# Patient Record
Sex: FEMALE | Race: WHITE | NOT HISPANIC OR LATINO | Employment: UNEMPLOYED | URBAN - METROPOLITAN AREA
[De-identification: names, ages, dates, MRNs, and addresses within clinical notes are randomized per-mention and may not be internally consistent; named-entity substitution may affect disease eponyms.]

---

## 2017-04-05 ENCOUNTER — OFFICE VISIT (OUTPATIENT)
Dept: URGENT CARE | Facility: CLINIC | Age: 57
End: 2017-04-05
Payer: COMMERCIAL

## 2017-04-05 VITALS
TEMPERATURE: 99 F | DIASTOLIC BLOOD PRESSURE: 70 MMHG | OXYGEN SATURATION: 99 % | HEART RATE: 74 BPM | RESPIRATION RATE: 18 BRPM | HEIGHT: 71 IN | WEIGHT: 147.5 LBS | SYSTOLIC BLOOD PRESSURE: 110 MMHG | BODY MASS INDEX: 20.65 KG/M2

## 2017-04-05 DIAGNOSIS — L25.9 CONTACT DERMATITIS, UNSPECIFIED CONTACT DERMATITIS TYPE, UNSPECIFIED TRIGGER: Primary | ICD-10-CM

## 2017-04-05 PROCEDURE — 99999 PR PBB SHADOW E&M-EST. PATIENT-LVL III: CPT | Mod: PBBFAC,,, | Performed by: NURSE PRACTITIONER

## 2017-04-05 PROCEDURE — 1160F RVW MEDS BY RX/DR IN RCRD: CPT | Mod: S$GLB,,, | Performed by: NURSE PRACTITIONER

## 2017-04-05 PROCEDURE — 99214 OFFICE O/P EST MOD 30 MIN: CPT | Mod: S$GLB,,, | Performed by: NURSE PRACTITIONER

## 2017-04-05 PROCEDURE — 96372 THER/PROPH/DIAG INJ SC/IM: CPT | Mod: S$GLB,,, | Performed by: NURSE PRACTITIONER

## 2017-04-05 RX ORDER — METHYLPREDNISOLONE ACETATE 80 MG/ML
80 INJECTION, SUSPENSION INTRA-ARTICULAR; INTRALESIONAL; INTRAMUSCULAR; SOFT TISSUE
Status: COMPLETED | OUTPATIENT
Start: 2017-04-05 | End: 2017-04-05

## 2017-04-05 RX ORDER — TRIAMCINOLONE ACETONIDE 1 MG/G
OINTMENT TOPICAL 2 TIMES DAILY
Qty: 1 TUBE | Refills: 0 | Status: SHIPPED | OUTPATIENT
Start: 2017-04-05 | End: 2022-04-01

## 2017-04-05 RX ORDER — PREDNISONE 20 MG/1
20 TABLET ORAL 2 TIMES DAILY
Qty: 10 TABLET | Refills: 0 | Status: SHIPPED | OUTPATIENT
Start: 2017-04-05 | End: 2017-04-10

## 2017-04-05 RX ADMIN — METHYLPREDNISOLONE ACETATE 80 MG: 80 INJECTION, SUSPENSION INTRA-ARTICULAR; INTRALESIONAL; INTRAMUSCULAR; SOFT TISSUE at 01:04

## 2017-04-05 NOTE — PATIENT INSTRUCTIONS
"  Contact Dermatitis  Contact dermatitis is a skin rash caused by something that touches the skin and makes it irritated and inflamed. Your skin may be red, swollen, dry, and may be cracked. Blisters may form and ooze. The rash will itch.  Contact dermatitis can form on the face and neck, backs of hands, forearms, genitals, and lower legs.  People can get contact dermatitis from lots of sources. These include:  · Plants such as poison ivy, oak, or sumac  · Chemicals in hair dyes and rinses, soaps, solvents, waxes, fingernail polish, and deodorants   · Jewelry or watchbands made of nickel  Contact dermatitis is not passed from person to person.  Talk with your healthcare provider about what may have caused the rash. A type of allergy testing called "patch testing" may be used to discover what you are allergic to. You will need to avoid the source of your rash in the future to prevent it from coming back.  Treatment is done to relieve itching and prevent the rash from coming back. The rash should go away in a few days to a few weeks.  Home care  Your healthcare provider may prescribe medicine to relieve swelling and itching. Follow all instructions when using these medicines.  General care:  · Avoid anything that heats up your skin, such as hot showers or baths, or direct sunlight. This can make itching worse.  · Apply cold compresses to soothe your sores to help relieve your symptoms. Do this for 30 minutes 3 to 4 times a day. You can make a cold compress by soaking a cloth in cold water. Squeeze out excess water. You can add colloidal oatmeal to the water to help reduce itching. For severe itching in a small area, apply an ice pack wrapped in a thin towel. Do this for 20 minutes 3 to 4 times a day.  · You can also try wet dressings. One way to do this is to wear a wet piece of clothing under a dry one. Wear a damp shirt under a dry shirt if your upper body is affected. This can relieve itching and prevent you from " scratching the affected area.  · You can also help relieve large areas of itching by taking a lukewarm bath with colloidal oatmeal added to the water.  · Use hydrocortisone cream for redness and irritation, unless another medicine was prescribed. You can also use benzocaine anesthetic cream or spray. Calamine lotion can also relieve mild symptoms.  · Use oral diphenhydramine to help reduce itching. You can buy this antihistamine at drug and grocery stores. It can make you sleepy, so use lower doses during the daytime. Or you can use loratadine. This is an antihistamine that will not make you sleepy. Do not use diphenhydramine if you have glaucoma or have trouble urinating due to an enlarged prostate.  · If a plant causes your rash, make sure to wash your skin and the clothes you were wearing when you came into contact with the plant. This is to wash away the plant oils that gave you the rash and prevent more or worse symptoms.  · Stay away from the substance or object that causes your symptoms. If you cant avoid it, wear gloves or some other type of protection.  Follow-up care  Follow up with your healthcare provider, or as advised.  When to seek medical advice  Call your healthcare provider right away if any of these occur:  · Spreading of the rash to other parts of your body  · Severe swelling of your face, eyelids, mouth, throat or tongue  · Trouble urinating due to swelling in the genital area  · Fever of 100.4°F (38°C) or higher  · Redness or swelling that gets worse  · Pain that gets worse  · Foul-smelling fluid leaking from the skin  · Yellow-brown crusts on the open blisters  Date Last Reviewed: 9/1/2016  © 1725-3393 [employer]. [address] [place] [address], [place] [address]. All rights reserved. This information is not intended as a substitute for professional medical care. Always follow your healthcare professional's instructions.

## 2017-04-05 NOTE — MR AVS SNAPSHOT
[facility]  [address] [facility] [address] [place] [address]  Phone: [phone]  Fax: [phone]                  Latasha Montes   2017 12:30 PM   Office Visit    Description:  Female : 1960   Provider:  Moody Johnson NP   Department:  [facility]           Reason for Visit     Rash           Diagnoses this Visit        Comments    Contact dermatitis, unspecified contact dermatitis type, unspecified trigger    -  Primary            To Do List           Goals (5 Years of Data)     None      Follow-Up and Disposition     Return if symptoms worsen or fail to improve.       These Medications        Disp Refills Start End    triamcinolone acetonide 0.1% (KENALOG) 0.1 % ointment 1 Tube 0 2017    Apply topically 2 (two) times daily. - Topical (Top)    Pharmacy: [id number] - [place], [address] Ph #: [phone]       predniSONE (DELTASONE) 20 MG tablet 10 tablet 0 2017 4/10/2017    Take 1 tablet (20 mg total) by mouth 2 (two) times daily. - Oral    Pharmacy: [id number] - [place], [address] Ph #: [phone]         [facility]     [facility] Nurse Care [facility] -  Assistance  Unless otherwise directed by your provider, please contact [facility], our nurse care line that is available for  assistance.     Registered nurses in the [facility] provide: appointment scheduling, clinical advisement, health education, and other advisory services.  Call: [phone] (toll free)               Medications           Message regarding Medications     Verify the changes and/or additions to your medication regime listed below are the same as discussed with your clinician today.  If any of these changes or additions are incorrect, please notify your healthcare provider.        START taking these NEW medications        Refills    triamcinolone acetonide 0.1%  "(KENALOG) 0.1 % ointment 0    Sig: Apply topically 2 (two) times daily.    Class: Normal    Route: Topical (Top)    predniSONE (DELTASONE) 20 MG tablet 0    Sig: Take 1 tablet (20 mg total) by mouth 2 (two) times daily.    Class: Normal    Route: Oral      These medications were administered today        Dose Freq    methylPREDNISolone acetate injection 80 mg 80 mg Clinic/HOD 1 time    Sig: Inject 1 mL (80 mg total) into the muscle one time.    Class: Normal    Route: Intramuscular           Verify that the below list of medications is an accurate representation of the medications you are currently taking.  If none reported, the list may be blank. If incorrect, please contact your healthcare provider. Carry this list with you in case of emergency.           Current Medications     estrogens,conjugated,-methyltestosterone 0.625-1.25mg (ESTRATEST HS) 0.625-1.25 mg per tablet Take 1 tablet by mouth once daily.    predniSONE (DELTASONE) 20 MG tablet Take 1 tablet (20 mg total) by mouth 2 (two) times daily.    sertraline (ZOLOFT) 25 MG tablet Take 25 mg by mouth once daily.    triamcinolone acetonide 0.1% (KENALOG) 0.1 % ointment Apply topically 2 (two) times daily.           Clinical Reference Information           Your Vitals Were     BP Pulse Temp Resp    110/70 (BP Location: Left arm, Patient Position: Sitting, BP Method: Manual) 74 98.5 °F (36.9 °C) (Tympanic) 18    Height Weight SpO2 BMI    5' 10.8" (1.798 m) 66.9 kg (147 lb 7.8 oz) 99% 20.69 kg/m2      Blood Pressure          Most Recent Value    BP  110/70      Allergies as of 4/5/2017     No Known Allergies      Immunizations Administered on Date of Encounter - 4/5/2017     None      [facility] Sign-Up     Activating your [facility] account is as easy as 1-2-3!     1) Visit [email or web address], select Sign Up Now, enter this activation code and your date of birth, then select Next.  [email or web address]  Expires: 5/20/2017  1:11 PM      2) Create a username and password " "to use when you visit [facility] in the future and select a security question in case you lose your password and select Next.    3) Enter your e-mail address and click Sign Up!    Additional Information  If you have questions, please e-mail [email or web address] or call [phone] to talk to our [facility] staff. Remember, [facility] is NOT to be used for urgent needs. For medical emergencies, dial 911.         Instructions      Contact Dermatitis  Contact dermatitis is a skin rash caused by something that touches the skin and makes it irritated and inflamed. Your skin may be red, swollen, dry, and may be cracked. Blisters may form and ooze. The rash will itch.  Contact dermatitis can form on the face and neck, backs of hands, forearms, genitals, and lower legs.  People can get contact dermatitis from lots of sources. These include:  · Plants such as poison ivy, oak, or sumac  · Chemicals in hair dyes and rinses, soaps, solvents, waxes, fingernail polish, and deodorants   · Jewelry or watchbands made of nickel  Contact dermatitis is not passed from person to person.  Talk with your healthcare provider about what may have caused the rash. A type of allergy testing called "patch testing" may be used to discover what you are allergic to. You will need to avoid the source of your rash in the future to prevent it from coming back.  Treatment is done to relieve itching and prevent the rash from coming back. The rash should go away in a few days to a few weeks.  Home care  Your healthcare provider may prescribe medicine to relieve swelling and itching. Follow all instructions when using these medicines.  General care:  · Avoid anything that heats up your skin, such as hot showers or baths, or direct sunlight. This can make itching worse.  · Apply cold compresses to soothe your sores to help relieve your symptoms. Do this for 30 minutes 3 to 4 times a day. You can make a cold compress by soaking a cloth in cold water. " Squeeze out excess water. You can add colloidal oatmeal to the water to help reduce itching. For severe itching in a small area, apply an ice pack wrapped in a thin towel. Do this for 20 minutes 3 to 4 times a day.  · You can also try wet dressings. One way to do this is to wear a wet piece of clothing under a dry one. Wear a damp shirt under a dry shirt if your upper body is affected. This can relieve itching and prevent you from scratching the affected area.  · You can also help relieve large areas of itching by taking a lukewarm bath with colloidal oatmeal added to the water.  · Use hydrocortisone cream for redness and irritation, unless another medicine was prescribed. You can also use benzocaine anesthetic cream or spray. Calamine lotion can also relieve mild symptoms.  · Use oral diphenhydramine to help reduce itching. You can buy this antihistamine at drug and grocery stores. It can make you sleepy, so use lower doses during the daytime. Or you can use loratadine. This is an antihistamine that will not make you sleepy. Do not use diphenhydramine if you have glaucoma or have trouble urinating due to an enlarged prostate.  · If a plant causes your rash, make sure to wash your skin and the clothes you were wearing when you came into contact with the plant. This is to wash away the plant oils that gave you the rash and prevent more or worse symptoms.  · Stay away from the substance or object that causes your symptoms. If you cant avoid it, wear gloves or some other type of protection.  Follow-up care  Follow up with your healthcare provider, or as advised.  When to seek medical advice  Call your healthcare provider right away if any of these occur:  · Spreading of the rash to other parts of your body  · Severe swelling of your face, eyelids, mouth, throat or tongue  · Trouble urinating due to swelling in the genital area  · Fever of 100.4°F (38°C) or higher  · Redness or swelling that gets worse  · Pain that  gets worse  · Foul-smelling fluid leaking from the skin  · Yellow-brown crusts on the open blisters  Date Last Reviewed: 9/1/2016  © 6327-4243 [employer]. [address] [place] [address], [place] [address]. All rights reserved. This information is not intended as a substitute for professional medical care. Always follow your healthcare professional's instructions.             Language Assistance Services     ATTENTION: Language assistance services are available, free of charge. Please call [phone].      ATENCIÓN: Si habla shaniqua, tiene a cardona disposición servicios gratuitos de asistencia lingüística. Llame al [phone].     CHÚ Ý: N?u b?n nói Ti?ng Vi?t, có các d?ch v? h? tr? ngôn ng? mi?n phí dành cho b?n. G?i s? [phone].         [facility] complies with applicable Federal civil rights laws and does not discriminate on the basis of race, color, national origin, age, disability, or sex.

## 2017-04-05 NOTE — PROGRESS NOTES
Sn Administered Methylprednisolone 80mg given IM left ventrogluteal. Patient tolerated well. No distress. Patient was instructed to wait 15 minutes in lobby after injection to assure that no reaction to the medication occurs. Patient was informed that if any unusual feeling occurs to let the  know so that we can address it. Patient stated understanding.
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
## 2017-07-29 ENCOUNTER — HOSPITAL ENCOUNTER (OUTPATIENT)
Dept: RADIOLOGY | Facility: HOSPITAL | Age: 57
Discharge: HOME | End: 2017-07-29
Attending: FAMILY MEDICINE
Payer: COMMERCIAL

## 2017-07-29 ENCOUNTER — OFFICE VISIT (OUTPATIENT)
Dept: URGENT CARE | Facility: CLINIC | Age: 57
End: 2017-07-29
Payer: COMMERCIAL

## 2017-07-29 VITALS
DIASTOLIC BLOOD PRESSURE: 69 MMHG | HEIGHT: 69 IN | BODY MASS INDEX: 22.27 KG/M2 | TEMPERATURE: 98 F | SYSTOLIC BLOOD PRESSURE: 122 MMHG | WEIGHT: 150.38 LBS

## 2017-07-29 DIAGNOSIS — M54.50 ACUTE BILATERAL LOW BACK PAIN WITHOUT SCIATICA: Primary | ICD-10-CM

## 2017-07-29 DIAGNOSIS — M54.50 ACUTE BILATERAL LOW BACK PAIN WITHOUT SCIATICA: ICD-10-CM

## 2017-07-29 DIAGNOSIS — T74.11XA: ICD-10-CM

## 2017-07-29 PROCEDURE — 99999 PR PBB SHADOW E&M-EST. PATIENT-LVL III: CPT | Mod: PBBFAC,,, | Performed by: FAMILY MEDICINE

## 2017-07-29 PROCEDURE — 99214 OFFICE O/P EST MOD 30 MIN: CPT | Mod: S$GLB,,, | Performed by: FAMILY MEDICINE

## 2017-07-29 PROCEDURE — 72100 X-RAY EXAM L-S SPINE 2/3 VWS: CPT | Mod: TC,PO

## 2017-07-29 PROCEDURE — 72100 X-RAY EXAM L-S SPINE 2/3 VWS: CPT | Mod: 26,,, | Performed by: RADIOLOGY

## 2017-07-29 NOTE — PATIENT INSTRUCTIONS
Self-Care for Low Back Pain    Most people have low back pain now and then. In many cases, it isnt serious and self-care can help. Sometimes low back pain can be a sign of a bigger problem. Call your healthcare provider if your pain returns often or gets worse over time. For the long-term care of your back, get regular exercise, lose any excess weight and learn good posture.  Take a short rest  Lying down during the day may be beneficial for short periods of time if severe pain increases with sitting or standing. Long-term bed rest could be detrimental.  Reduce pain and swelling  Cold reduces swelling. Both cold and heat can reduce pain. Protect your skin by placing a towel between your body and the ice or heat source.  · For the first few days, apply an ice pack for 15 to 20 minutes .  · After the first few days, try heat for 15 minutes at a time to ease pain. Never sleep on a heating pad.  · Over-the-counter medicine can help control pain and swelling. Try aspirin or ibuprofen.  Exercise  Exercise can help your back heal. It also helps your back get stronger and more flexible, preventing any reinjury. Ask your healthcare provider about specific exercises for your back.  Use good posture to avoid reinjury  · When moving, bend at the hips and knees. Dont bend at the waist or twist around.  · When lifting, keep the object close to your body. Dont try to lift more than you can handle.  · When sitting, keep your lower back supported. Use a rolled-up towel as needed.  Seek immediate medical care if:  · Youre unable to stand or walk.  · You have a temperature over 100.4°F (38.0°C)  · You have frequent, painful, or bloody urination.  · You have severe abdominal pain.  · You have a sharp, stabbing pain.  · Your pain is constant.  · You have pain or numbness in your leg.  · You feel pain in a new area of your back.  · You notice that the pain isnt decreasing after more than a week.   Date Last Reviewed: 9/29/2015  ©  8038-0892 [employer]. [address] [place] [address], [place] [address]. All rights reserved. This information is not intended as a substitute for professional medical care. Always follow your healthcare professional's instructions.
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
## 2017-08-31 ENCOUNTER — TELEPHONE (OUTPATIENT)
Dept: FAMILY MEDICINE | Facility: CLINIC | Age: 57
End: 2017-08-31

## 2017-08-31 NOTE — TELEPHONE ENCOUNTER
----- Message from Priscila Wilkinson sent at 8/31/2017 12:14 PM CDT -----  Please call pt back at [phone] about test results. Please call between [phone]. Please call at [phone].

## 2017-09-28 ENCOUNTER — TELEPHONE (OUTPATIENT)
Dept: FAMILY MEDICINE | Facility: CLINIC | Age: 57
End: 2017-09-28

## 2017-09-28 NOTE — TELEPHONE ENCOUNTER
----- Message from Solange Hickey sent at 9/28/2017  9:20 AM CDT -----  needs office notes faxed to her at [phone]//ph:[phone] or [phone]

## 2022-04-01 ENCOUNTER — OFFICE VISIT (OUTPATIENT)
Dept: INTERNAL MEDICINE | Facility: CLINIC | Age: 62
End: 2022-04-01
Payer: MEDICARE

## 2022-04-01 ENCOUNTER — LAB VISIT (OUTPATIENT)
Dept: LAB | Facility: HOSPITAL | Age: 62
End: 2022-04-01
Attending: FAMILY MEDICINE
Payer: MEDICARE

## 2022-04-01 DIAGNOSIS — Z01.419 WELL WOMAN EXAM: ICD-10-CM

## 2022-04-01 DIAGNOSIS — Z12.11 COLON CANCER SCREENING: ICD-10-CM

## 2022-04-01 DIAGNOSIS — R49.0 CHRONIC HOARSENESS: ICD-10-CM

## 2022-04-01 DIAGNOSIS — F41.1 GAD (GENERALIZED ANXIETY DISORDER): ICD-10-CM

## 2022-04-01 DIAGNOSIS — Z00.00 ROUTINE GENERAL MEDICAL EXAMINATION AT A HEALTH CARE FACILITY: ICD-10-CM

## 2022-04-01 DIAGNOSIS — Z00.00 ROUTINE GENERAL MEDICAL EXAMINATION AT A HEALTH CARE FACILITY: Primary | ICD-10-CM

## 2022-04-01 DIAGNOSIS — Z12.31 ENCOUNTER FOR SCREENING MAMMOGRAM FOR MALIGNANT NEOPLASM OF BREAST: ICD-10-CM

## 2022-04-01 DIAGNOSIS — Z78.0 ASYMPTOMATIC MENOPAUSAL STATE: ICD-10-CM

## 2022-04-01 DIAGNOSIS — Z13.6 SCREENING FOR CARDIOVASCULAR CONDITION: ICD-10-CM

## 2022-04-01 PROBLEM — F41.9 ANXIETY: Status: ACTIVE | Noted: 2022-04-01

## 2022-04-01 PROBLEM — F41.9 ANXIETY: Status: RESOLVED | Noted: 2022-04-01 | Resolved: 2022-04-01

## 2022-04-01 PROCEDURE — 85025 COMPLETE CBC W/AUTO DIFF WBC: CPT | Performed by: FAMILY MEDICINE

## 2022-04-01 PROCEDURE — 99999 PR PBB SHADOW E&M-NEW PATIENT-LVL III: CPT | Mod: PBBFAC,,, | Performed by: FAMILY MEDICINE

## 2022-04-01 PROCEDURE — 1159F PR MEDICATION LIST DOCUMENTED IN MEDICAL RECORD: ICD-10-PCS | Mod: CPTII,S$GLB,, | Performed by: FAMILY MEDICINE

## 2022-04-01 PROCEDURE — 86803 HEPATITIS C AB TEST: CPT | Performed by: FAMILY MEDICINE

## 2022-04-01 PROCEDURE — 1160F RVW MEDS BY RX/DR IN RCRD: CPT | Mod: CPTII,S$GLB,, | Performed by: FAMILY MEDICINE

## 2022-04-01 PROCEDURE — 1159F MED LIST DOCD IN RCRD: CPT | Mod: CPTII,S$GLB,, | Performed by: FAMILY MEDICINE

## 2022-04-01 PROCEDURE — 99999 PR PBB SHADOW E&M-NEW PATIENT-LVL III: ICD-10-PCS | Mod: PBBFAC,,, | Performed by: FAMILY MEDICINE

## 2022-04-01 PROCEDURE — 1160F PR REVIEW ALL MEDS BY PRESCRIBER/CLIN PHARMACIST DOCUMENTED: ICD-10-PCS | Mod: CPTII,S$GLB,, | Performed by: FAMILY MEDICINE

## 2022-04-01 PROCEDURE — 99204 OFFICE O/P NEW MOD 45 MIN: CPT | Mod: S$GLB,,, | Performed by: FAMILY MEDICINE

## 2022-04-01 PROCEDURE — 99204 PR OFFICE/OUTPT VISIT, NEW, LEVL IV, 45-59 MIN: ICD-10-PCS | Mod: S$GLB,,, | Performed by: FAMILY MEDICINE

## 2022-04-01 PROCEDURE — 84443 ASSAY THYROID STIM HORMONE: CPT | Performed by: FAMILY MEDICINE

## 2022-04-01 PROCEDURE — 80053 COMPREHEN METABOLIC PANEL: CPT | Performed by: FAMILY MEDICINE

## 2022-04-01 PROCEDURE — 87389 HIV-1 AG W/HIV-1&-2 AB AG IA: CPT | Performed by: FAMILY MEDICINE

## 2022-04-01 RX ORDER — DIPHENHYDRAMINE HCL 25 MG
25 CAPSULE ORAL EVERY 6 HOURS PRN
COMMUNITY
End: 2022-10-13

## 2022-04-01 RX ORDER — CYCLOSPORINE 0.5 MG/ML
1 EMULSION OPHTHALMIC
COMMUNITY
End: 2022-10-13

## 2022-04-01 RX ORDER — SERTRALINE HYDROCHLORIDE 50 MG/1
50 TABLET, FILM COATED ORAL NIGHTLY
Qty: 90 TABLET | Refills: 1 | Status: SHIPPED | OUTPATIENT
Start: 2022-04-01 | End: 2022-11-25

## 2022-04-01 RX ORDER — MUPIROCIN 20 MG/G
OINTMENT TOPICAL
COMMUNITY
End: 2023-10-09

## 2022-04-01 RX ORDER — SERTRALINE HYDROCHLORIDE 50 MG/1
50 TABLET, FILM COATED ORAL DAILY
COMMUNITY
End: 2022-04-01 | Stop reason: SDUPTHER

## 2022-04-01 NOTE — PROGRESS NOTES
"Subjective:       Patient ID: Latasha Montes is a 62 y.o. female.    Chief Complaint: Establish Care, Annual Exam, and Hoarse    62-year-old female patient new to my clinic here to establish care.  Patient with Patient Active Problem List:     EARL (generalized anxiety disorder)  Here for routine annual physicals.  Denies any chest pain or difficulty breathing with palpitations and has been staying physically active.  Reports that patient started having hoarseness to her voice in September, and has appointment with ENT for further evaluation.  Denies any allergy like symptoms with postnasal drip.   Anxiety has been stable on Zoloft 50 mg    Review of Systems   Constitutional: Negative for fatigue.   HENT: Positive for voice change. Negative for congestion, postnasal drip and trouble swallowing.    Eyes: Negative for visual disturbance.   Respiratory: Negative for shortness of breath.    Cardiovascular: Negative for chest pain and leg swelling.   Gastrointestinal: Negative for abdominal pain, nausea and vomiting.   Musculoskeletal: Negative for myalgias.   Skin: Negative for rash.   Neurological: Negative for light-headedness and headaches.   Psychiatric/Behavioral: Negative for sleep disturbance. The patient is nervous/anxious.          BP (P) 100/66 (BP Location: Left arm, Patient Position: Sitting, BP Method: Medium (Manual))   Pulse (P) 76   Resp (P) 18   Ht (P) 5' 9" (1.753 m)   Wt (P) 68.9 kg (151 lb 14.4 oz)   SpO2 (P) 97%   BMI (P) 22.43 kg/m²   Objective:      Physical Exam  Constitutional:       Appearance: She is well-developed.   HENT:      Head: Normocephalic and atraumatic.      Nose: Nose normal.      Mouth/Throat:      Mouth: Mucous membranes are moist.      Pharynx: No oropharyngeal exudate or posterior oropharyngeal erythema.   Cardiovascular:      Rate and Rhythm: Normal rate and regular rhythm.      Heart sounds: Normal heart sounds. No murmur heard.  Pulmonary:      Effort: Pulmonary " effort is normal.      Breath sounds: Normal breath sounds. No wheezing.   Abdominal:      General: Bowel sounds are normal.      Palpations: Abdomen is soft.      Tenderness: There is no abdominal tenderness.   Skin:     General: Skin is warm and dry.      Findings: No rash.   Neurological:      Mental Status: She is alert and oriented to person, place, and time.   Psychiatric:         Mood and Affect: Mood normal.           Assessment/Plan:   1. Routine general medical examination at a health care facility  - CBC Auto Differential; Future  - Comprehensive Metabolic Panel; Future  - Lipid Panel; Future  - TSH; Future  - Urinalysis, Reflex to Urine Culture Urine, Clean Catch; Future  - Hepatitis C Antibody; Future  - HIV 1/2 Ag/Ab (4th Gen); Future  Vital signs stable today.  Clinical exam stable  Continue lifestyle modifications with low-fat and low-cholesterol diet and exercise 30 minutes daily  Patient was encouraged to get tetanus and shingles vaccination at outside pharmacy    2. Encounter for screening mammogram for malignant neoplasm of breast  - Mammo Digital Screening Bilat w/ Dragan; Future  Due for mammogram and DEXA scan    3. Asymptomatic menopausal state  - DXA Bone Density Spine And Hip; Future    4. Well woman exam  - CBC Auto Differential; Future  - Ambulatory referral/consult to Gynecology; Future    5. Colon cancer screening  - Cologuard Screening (Multitarget Stool DNA); Future  - Cologuard Screening (Multitarget Stool DNA)  Due for colonoscopy but patient refuses at this time    6. EARL (generalized anxiety disorder)  - CBC Auto Differential; Future  - Comprehensive Metabolic Panel; Future  - TSH; Future  - sertraline (ZOLOFT) 50 MG tablet; Take 1 tablet (50 mg total) by mouth every evening.  Dispense: 90 tablet; Refill: 1  Stable on Zoloft 50 mg daily    7. Screening for cardiovascular condition  - Lipid Panel; Future    8. Chronic hoarseness   Patient has appointment with ENT for further  evaluation

## 2022-04-02 LAB
ALBUMIN SERPL BCP-MCNC: 4.3 G/DL (ref 3.5–5.2)
ALP SERPL-CCNC: 60 U/L (ref 55–135)
ALT SERPL W/O P-5'-P-CCNC: 23 U/L (ref 10–44)
ANION GAP SERPL CALC-SCNC: 11 MMOL/L (ref 8–16)
AST SERPL-CCNC: 30 U/L (ref 10–40)
BASOPHILS # BLD AUTO: 0.03 K/UL (ref 0–0.2)
BASOPHILS NFR BLD: 0.5 % (ref 0–1.9)
BILIRUB SERPL-MCNC: 0.6 MG/DL (ref 0.1–1)
BUN SERPL-MCNC: 14 MG/DL (ref 8–23)
CALCIUM SERPL-MCNC: 10.1 MG/DL (ref 8.7–10.5)
CHLORIDE SERPL-SCNC: 100 MMOL/L (ref 95–110)
CO2 SERPL-SCNC: 28 MMOL/L (ref 23–29)
CREAT SERPL-MCNC: 0.8 MG/DL (ref 0.5–1.4)
DIFFERENTIAL METHOD: ABNORMAL
EOSINOPHIL # BLD AUTO: 0.1 K/UL (ref 0–0.5)
EOSINOPHIL NFR BLD: 2.4 % (ref 0–8)
ERYTHROCYTE [DISTWIDTH] IN BLOOD BY AUTOMATED COUNT: 12 % (ref 11.5–14.5)
EST. GFR  (AFRICAN AMERICAN): >60 ML/MIN/1.73 M^2
EST. GFR  (NON AFRICAN AMERICAN): >60 ML/MIN/1.73 M^2
GLUCOSE SERPL-MCNC: 77 MG/DL (ref 70–110)
HCT VFR BLD AUTO: 43.3 % (ref 37–48.5)
HGB BLD-MCNC: 13.8 G/DL (ref 12–16)
IMM GRANULOCYTES # BLD AUTO: 0.01 K/UL (ref 0–0.04)
IMM GRANULOCYTES NFR BLD AUTO: 0.2 % (ref 0–0.5)
LYMPHOCYTES # BLD AUTO: 2 K/UL (ref 1–4.8)
LYMPHOCYTES NFR BLD: 34.8 % (ref 18–48)
MCH RBC QN AUTO: 29.5 PG (ref 27–31)
MCHC RBC AUTO-ENTMCNC: 31.9 G/DL (ref 32–36)
MCV RBC AUTO: 93 FL (ref 82–98)
MONOCYTES # BLD AUTO: 0.5 K/UL (ref 0.3–1)
MONOCYTES NFR BLD: 8.7 % (ref 4–15)
NEUTROPHILS # BLD AUTO: 3.1 K/UL (ref 1.8–7.7)
NEUTROPHILS NFR BLD: 53.4 % (ref 38–73)
NRBC BLD-RTO: 0 /100 WBC
PLATELET # BLD AUTO: 269 K/UL (ref 150–450)
PMV BLD AUTO: 10.5 FL (ref 9.2–12.9)
POTASSIUM SERPL-SCNC: 3.9 MMOL/L (ref 3.5–5.1)
PROT SERPL-MCNC: 7.8 G/DL (ref 6–8.4)
RBC # BLD AUTO: 4.68 M/UL (ref 4–5.4)
SODIUM SERPL-SCNC: 139 MMOL/L (ref 136–145)
TSH SERPL DL<=0.005 MIU/L-ACNC: 1.02 UIU/ML (ref 0.4–4)
WBC # BLD AUTO: 5.86 K/UL (ref 3.9–12.7)

## 2022-04-04 LAB
HCV AB SERPL QL IA: NEGATIVE
HIV 1+2 AB+HIV1 P24 AG SERPL QL IA: NEGATIVE

## 2022-04-05 ENCOUNTER — PATIENT MESSAGE (OUTPATIENT)
Dept: INTERNAL MEDICINE | Facility: CLINIC | Age: 62
End: 2022-04-05
Payer: MEDICARE

## 2022-04-05 ENCOUNTER — OFFICE VISIT (OUTPATIENT)
Dept: OBSTETRICS AND GYNECOLOGY | Facility: CLINIC | Age: 62
End: 2022-04-05
Payer: MEDICARE

## 2022-04-05 ENCOUNTER — PATIENT MESSAGE (OUTPATIENT)
Dept: OBSTETRICS AND GYNECOLOGY | Facility: CLINIC | Age: 62
End: 2022-04-05

## 2022-04-05 ENCOUNTER — TELEPHONE (OUTPATIENT)
Dept: OBSTETRICS AND GYNECOLOGY | Facility: CLINIC | Age: 62
End: 2022-04-05
Payer: MEDICARE

## 2022-04-05 VITALS
SYSTOLIC BLOOD PRESSURE: 122 MMHG | BODY MASS INDEX: 22.33 KG/M2 | DIASTOLIC BLOOD PRESSURE: 84 MMHG | WEIGHT: 151.25 LBS

## 2022-04-05 DIAGNOSIS — Z12.4 ENCOUNTER FOR SCREENING FOR CERVICAL CANCER: ICD-10-CM

## 2022-04-05 DIAGNOSIS — Z01.419 WELL WOMAN EXAM WITH ROUTINE GYNECOLOGICAL EXAM: Primary | ICD-10-CM

## 2022-04-05 PROCEDURE — 3074F PR MOST RECENT SYSTOLIC BLOOD PRESSURE < 130 MM HG: ICD-10-PCS | Mod: CPTII,S$GLB,, | Performed by: NURSE PRACTITIONER

## 2022-04-05 PROCEDURE — 88175 CYTOPATH C/V AUTO FLUID REDO: CPT | Performed by: NURSE PRACTITIONER

## 2022-04-05 PROCEDURE — 1160F PR REVIEW ALL MEDS BY PRESCRIBER/CLIN PHARMACIST DOCUMENTED: ICD-10-PCS | Mod: CPTII,S$GLB,, | Performed by: NURSE PRACTITIONER

## 2022-04-05 PROCEDURE — 99999 PR PBB SHADOW E&M-EST. PATIENT-LVL III: ICD-10-PCS | Mod: PBBFAC,,, | Performed by: NURSE PRACTITIONER

## 2022-04-05 PROCEDURE — 1159F MED LIST DOCD IN RCRD: CPT | Mod: CPTII,S$GLB,, | Performed by: NURSE PRACTITIONER

## 2022-04-05 PROCEDURE — 1159F PR MEDICATION LIST DOCUMENTED IN MEDICAL RECORD: ICD-10-PCS | Mod: CPTII,S$GLB,, | Performed by: NURSE PRACTITIONER

## 2022-04-05 PROCEDURE — 3008F PR BODY MASS INDEX (BMI) DOCUMENTED: ICD-10-PCS | Mod: CPTII,S$GLB,, | Performed by: NURSE PRACTITIONER

## 2022-04-05 PROCEDURE — 3079F PR MOST RECENT DIASTOLIC BLOOD PRESSURE 80-89 MM HG: ICD-10-PCS | Mod: CPTII,S$GLB,, | Performed by: NURSE PRACTITIONER

## 2022-04-05 PROCEDURE — 1160F RVW MEDS BY RX/DR IN RCRD: CPT | Mod: CPTII,S$GLB,, | Performed by: NURSE PRACTITIONER

## 2022-04-05 PROCEDURE — 3008F BODY MASS INDEX DOCD: CPT | Mod: CPTII,S$GLB,, | Performed by: NURSE PRACTITIONER

## 2022-04-05 PROCEDURE — 99999 PR PBB SHADOW E&M-EST. PATIENT-LVL III: CPT | Mod: PBBFAC,,, | Performed by: NURSE PRACTITIONER

## 2022-04-05 PROCEDURE — 87624 HPV HI-RISK TYP POOLED RSLT: CPT | Performed by: NURSE PRACTITIONER

## 2022-04-05 PROCEDURE — 3074F SYST BP LT 130 MM HG: CPT | Mod: CPTII,S$GLB,, | Performed by: NURSE PRACTITIONER

## 2022-04-05 PROCEDURE — 3079F DIAST BP 80-89 MM HG: CPT | Mod: CPTII,S$GLB,, | Performed by: NURSE PRACTITIONER

## 2022-04-05 RX ORDER — MUPIROCIN CALCIUM 20 MG/G
CREAM TOPICAL
Qty: 30 G | Refills: 0 | Status: SHIPPED | OUTPATIENT
Start: 2022-04-05 | End: 2022-10-13

## 2022-04-05 NOTE — PROGRESS NOTES
"Subjective:       Patient ID: Latasha Montes is a 62 y.o. female.    Chief Complaint:  Well Woman    No LMP recorded. Patient is postmenopausal.  History of Present Illness  Annual Exam-Postmenopausal  Patient presents for annual exam. The patient has no complaints today. The patient is not sexually active. GYN screening history: last pap: approximate date "" and was normal and last mammogram: approximate date "2019" and was normal. The patient is not taking hormone replacement therapy. Patient denies post-menopausal vaginal bleeding. The patient wears seatbelts: yes. The patient participates in regular exercise: no. Has the patient ever been transfused or tattooed?: no. The patient reports that there is not domestic violence in her life.    OB History    Para Term  AB Living   0 0 0 0 0 0   SAB IAB Ectopic Multiple Live Births   0 0 0 0 0       Review of Systems  Review of Systems   Constitutional: Negative for appetite change, fatigue, fever and unexpected weight change.   Eyes: Negative for visual disturbance.   Cardiovascular: Negative for chest pain.   Gastrointestinal: Negative for abdominal pain, bloating, constipation, diarrhea, nausea and vomiting.   Genitourinary: Negative for bladder incontinence, dysmenorrhea, dyspareunia, dysuria, flank pain, frequency, genital sores, menorrhagia, menstrual problem, pelvic pain, urgency, vaginal bleeding, vaginal discharge, vaginal pain, postcoital bleeding, vaginal dryness and vaginal odor.   Integumentary:  Negative for rash, acne, mole/lesion, breast mass, nipple discharge, breast skin changes and breast tenderness.   Neurological: Negative for syncope and headaches.   Hematological: Negative for adenopathy. Does not bruise/bleed easily.   All other systems reviewed and are negative.  Breast: Positive for breast self exam.Negative for asymmetry, lump, mass, nipple discharge, skin changes and tenderness           Objective:      Physical Exam: "   Constitutional: She is oriented to person, place, and time. She appears well-developed and well-nourished.    HENT:   Head: Normocephalic and atraumatic.    Eyes: Pupils are equal, round, and reactive to light. Conjunctivae and EOM are normal.     Cardiovascular: Normal rate and regular rhythm.     Pulmonary/Chest: Effort normal. Right breast exhibits no inverted nipple, no mass, no nipple discharge, no skin change, no tenderness, no bleeding and no swelling. Left breast exhibits no inverted nipple, no mass, no nipple discharge, no skin change, no tenderness, no bleeding and no swelling. Breasts are symmetrical.        Abdominal: Soft. Hernia confirmed negative in the right inguinal area and confirmed negative in the left inguinal area.     Genitourinary:    Inguinal canal, vagina, uterus, right adnexa, left adnexa and rectum normal.      Pelvic exam was performed with patient supine.   The external female genitalia was normal.   Genitalia hair distrobution normal .   Labial bartholins normal.There is no rash, tenderness, lesion or injury on the right labia. There is no rash, tenderness, lesion or injury on the left labia. Cervix is normal. No erythema,  no vaginal discharge, bleeding, rectocele, cystocele or unspecified prolapse of vaginal walls in the vagina. Vaginal atrophy noted. Cervix exhibits no motion tenderness and no friability. Uterus is not tender.           Musculoskeletal: Normal range of motion and moves all extremeties.      Lymphadenopathy: No inguinal adenopathy noted on the right or left side.    Neurological: She is alert and oriented to person, place, and time.    Skin: Skin is warm and dry. No rash noted. No erythema.    Psychiatric: She has a normal mood and affect. Her behavior is normal. Judgment and thought content normal.            Assessment:     1. Well woman exam with routine gynecological exam    2. Encounter for screening for cervical cancer              Plan:   Latasha was seen  today for well woman.    Diagnoses and all orders for this visit:    Well woman exam with routine gynecological exam  -     Ambulatory referral/consult to Gynecology  -     Liquid-Based Pap Smear, Screening  -     HPV High Risk Genotypes, PCR    Encounter for screening for cervical cancer  -     Liquid-Based Pap Smear, Screening  -     HPV High Risk Genotypes, PCR    Other orders  -     mupirocin calcium 2% (BACTROBAN) 2 % cream; Apply to affected area 3 times daily      Follow up with me in 1 year for annual well woman exam.

## 2022-04-05 NOTE — TELEPHONE ENCOUNTER
mupirocin calcium 2% (BACTROBAN) 2 % cream 30 g 0 4/5/2022 4/5/2023    Sig: Apply to affected area 3 times daily    Sent to pharmacy as: mupirocin calcium 2% (BACTROBAN) 2 % cream    E-Prescribing Status: Receipt confirmed by pharmacy (4/5/2022  8:20 AM CDT)    Pharmacy    [id number] - [place], [place] - [address] [place] [address] A     Called pharmacy and ok'd changing to ointment (looks like that's what she was using before).

## 2022-04-05 NOTE — TELEPHONE ENCOUNTER
----- Message from Guanaco Bradford sent at 4/5/2022 10:43 AM CDT -----  .Type:  Pharmacy Calling to Clarify an RX    Name of Caller: Patric  Pharmacy Name:.    [id number] - [place] [address], [facility] [address]  Phone: [phone] Fax: [phone]      Prescription Name:Bactroban cream  What do they need to clarify?:  Best Call Back Number:[phone]  Additional Information: cream unavailable would like to change to ointment

## 2022-04-15 ENCOUNTER — PATIENT MESSAGE (OUTPATIENT)
Dept: ADMINISTRATIVE | Facility: HOSPITAL | Age: 62
End: 2022-04-15
Payer: MEDICARE

## 2022-04-21 LAB — NONINV COLON CA DNA+OCC BLD SCRN STL QL: NEGATIVE

## 2022-04-25 ENCOUNTER — HOSPITAL ENCOUNTER (OUTPATIENT)
Dept: RADIOLOGY | Facility: HOSPITAL | Age: 62
Discharge: HOME | End: 2022-04-25
Attending: FAMILY MEDICINE
Payer: MEDICARE

## 2022-04-25 VITALS — BODY MASS INDEX: 22.36 KG/M2 | WEIGHT: 151 LBS | HEIGHT: 69 IN

## 2022-04-25 DIAGNOSIS — Z12.31 ENCOUNTER FOR SCREENING MAMMOGRAM FOR MALIGNANT NEOPLASM OF BREAST: ICD-10-CM

## 2022-04-25 PROCEDURE — 77063 BREAST TOMOSYNTHESIS BI: CPT | Mod: 26,,, | Performed by: RADIOLOGY

## 2022-04-25 PROCEDURE — 77063 MAMMO DIGITAL SCREENING BILAT WITH TOMO: ICD-10-PCS | Mod: 26,,, | Performed by: RADIOLOGY

## 2022-04-25 PROCEDURE — 77067 SCR MAMMO BI INCL CAD: CPT | Mod: 26,,, | Performed by: RADIOLOGY

## 2022-04-25 PROCEDURE — 77067 MAMMO DIGITAL SCREENING BILAT WITH TOMO: ICD-10-PCS | Mod: 26,,, | Performed by: RADIOLOGY

## 2022-04-25 PROCEDURE — 77067 SCR MAMMO BI INCL CAD: CPT | Mod: TC

## 2022-04-25 PROCEDURE — 77063 BREAST TOMOSYNTHESIS BI: CPT | Mod: TC

## 2022-04-29 ENCOUNTER — PATIENT MESSAGE (OUTPATIENT)
Dept: INTERNAL MEDICINE | Facility: CLINIC | Age: 62
End: 2022-04-29
Payer: MEDICARE

## 2022-05-09 ENCOUNTER — OFFICE VISIT (OUTPATIENT)
Dept: INTERNAL MEDICINE | Facility: CLINIC | Age: 62
End: 2022-05-09
Payer: MEDICARE

## 2022-05-09 DIAGNOSIS — M85.80 OSTEOPENIA, UNSPECIFIED LOCATION: Primary | ICD-10-CM

## 2022-05-09 DIAGNOSIS — F41.1 GAD (GENERALIZED ANXIETY DISORDER): ICD-10-CM

## 2022-05-09 PROCEDURE — 1160F PR REVIEW ALL MEDS BY PRESCRIBER/CLIN PHARMACIST DOCUMENTED: ICD-10-PCS | Mod: CPTII,95,, | Performed by: FAMILY MEDICINE

## 2022-05-09 PROCEDURE — 1159F MED LIST DOCD IN RCRD: CPT | Mod: CPTII,95,, | Performed by: FAMILY MEDICINE

## 2022-05-09 PROCEDURE — 99442 PR PHYSICIAN TELEPHONE EVALUATION 11-20 MIN: CPT | Mod: 95,,, | Performed by: FAMILY MEDICINE

## 2022-05-09 PROCEDURE — 1159F PR MEDICATION LIST DOCUMENTED IN MEDICAL RECORD: ICD-10-PCS | Mod: CPTII,95,, | Performed by: FAMILY MEDICINE

## 2022-05-09 PROCEDURE — 1160F RVW MEDS BY RX/DR IN RCRD: CPT | Mod: CPTII,95,, | Performed by: FAMILY MEDICINE

## 2022-05-09 PROCEDURE — 99442 PR PHYSICIAN TELEPHONE EVALUATION 11-20 MIN: ICD-10-PCS | Mod: 95,,, | Performed by: FAMILY MEDICINE

## 2022-05-09 NOTE — PROGRESS NOTES
Established Patient - Audio Only Telehealth Visit     The patient location is: Home  The chief complaint leading to consultation is: Results   Visit type: Virtual visit with audio only (telephone)  Total time spent with patient: 15 minutes  Subjective:       Patient ID: Latasha Montes is a 62 y.o. female.    Chief Complaint: No chief complaint on file.    62-year-old female patient with Patient Active Problem List:     EARL (generalized anxiety disorder)  Here to discuss about recent test results.  Reports that she has been taking Zoloft regularly and her anxiety has been stable, no worsening reported.   Has been staying physically active.  Denies any significant complaints today    Review of Systems   Musculoskeletal: Negative for arthralgias.   Psychiatric/Behavioral: The patient is nervous/anxious.          There were no vitals taken for this visit.  Objective:      Physical Exam  Neurological:      Mental Status: She is alert and oriented to person, place, and time.   Psychiatric:         Mood and Affect: Mood normal.         Office Visit on 04/05/2022   Component Date Value Ref Range Status    Cytology ThinPrep Pap Source 04/05/2022 Cervix   Final    Cytology ThinPrep Pap Report Status 04/05/2022 DNR   Final    Cytology Thinprep PAP Clinical His* 04/05/2022 Routine exam   Corrected    Cytology ThinPrep Pap LMP 04/05/2022 none   Final    Cytology ThinPrep Previous PAP 04/05/2022 Unknown   Final    Cytology ThinPrep Previous Biopsy 04/05/2022 No   Final    Cytology ThinPrep PAP Adequacy 04/05/2022 SEE BELOW   Final    Comment: Satisfactory for evaluation. Endocervical/transformation zone   component   absent.      Cytology ThinPrep PAP General Maria Victoria* 04/05/2022 DNR   Final    Cytology ThinPrep PAP Interpretati* 04/05/2022 SEE BELOW   Final    Comment: Negative for intraepithelial lesion or malignancy. Atrophic pattern;   predominantly parabasal cells      Cytology ThinPrep PAP Comment 04/05/2022 SEE  BELOW   Final    This Pap test has been evaluated with computer assisted technology.    Cytotechnologist 04/05/2022 SEE BELOW   Final    Comment: PMT, CT(ASCP) CT screening location: [address],   [facility] [address]      Review Cytotechnologist 04/05/2022 DNR   Final    Pathologist 04/05/2022 DNR   Final    Cytology ThinPrep PAP Infection 04/05/2022 DNR   Final    Cytology Thin Prep Pap Explanation 04/05/2022 SEE BELOW   Final    Comment: EXPLANATORY NOTE:     The Pap is a screening test for cervical cancer. It is   not a diagnostic test and is subject to false negative   and false positive results. It is most reliable when a   satisfactory sample, regularly obtained, is submitted   with relevant clinical findings and history, and when   the Pap result is evaluated along with historic and   current clinical information.    TEST PERFORMED AT:  [facility] [address] [address]  EFRAIN COBB MD      HPV DNA High Risk 04/05/2022 Not Detected  NOT DETECTED Final    Comment: Not Detected High Risk HPV types (16,18,31,33,35,39,45,51,52,  56,58,59,66,68) were not detected. Other HPV  types which cause anogenital lesions may be present.  The significance of the other types of HPV  in malignant processes has not been established.    Methodology: Real Time PCR                        TEST PERFORMED AT:  [facility]/[address] [address]  ARIANNA GRIGGS MD,PHD     Lab Visit on 04/01/2022   Component Date Value Ref Range Status    Specimen UA 04/01/2022 Urine, Clean Catch   Final    Color, UA 04/01/2022 Yellow  Yellow, Straw, Melony Final    Appearance, UA 04/01/2022 Clear  Clear Final    pH, UA 04/01/2022 6.0  5.0 - 8.0 Final    Specific Gravity, UA 04/01/2022 1.025  1.005 - 1.030 Final    Protein, UA 04/01/2022 Negative  Negative Final    Comment: Recommend a 24 hour urine protein or a urine    protein/creatinine ratio if globulin induced proteinuria is  clinically suspected.      Glucose, UA 04/01/2022 Negative  Negative Final    Ketones, UA 04/01/2022 Trace (A) Negative Final    Bilirubin (UA) 04/01/2022 Negative  Negative Final    Occult Blood UA 04/01/2022 Negative  Negative Final    Nitrite, UA 04/01/2022 Negative  Negative Final    Leukocytes, UA 04/01/2022 Negative  Negative Final   Lab Visit on 04/01/2022   Component Date Value Ref Range Status    Cholesterol 04/01/2022 235 (A) 120 - 199 mg/dL Final    Comment: The National Cholesterol Education Program (NCEP) has set the  following guidelines (reference ranges) for Cholesterol:  Optimal.....................<200 mg/dL  Borderline High.............200-239 mg/dL  High........................> or = 240 mg/dL      Triglycerides 04/01/2022 57  30 - 150 mg/dL Final    Comment: The National Cholesterol Education Program (NCEP) has set the  following guidelines (reference values) for triglycerides:  Normal......................<150 mg/dL  Borderline High.............150-199 mg/dL  High........................200-499 mg/dL      HDL 04/01/2022 83 (A) 40 - 75 mg/dL Final    Comment: The National Cholesterol Education Program (NCEP) has set the  following guidelines (reference values) for HDL Cholesterol:  Low...............<40 mg/dL  Optimal...........>60 mg/dL      LDL Cholesterol 04/01/2022 140.6  63.0 - 159.0 mg/dL Final    Comment: The National Cholesterol Education Program (NCEP) has set the  following guidelines (reference values) for LDL Cholesterol:  Optimal.......................<130 mg/dL  Borderline High...............130-159 mg/dL  High..........................160-189 mg/dL  Very High.....................>190 mg/dL      HDL/Cholesterol Ratio 04/01/2022 35.3  20.0 - 50.0 % Final    Total Cholesterol/HDL Ratio 04/01/2022 2.8  2.0 - 5.0 Final    Non-HDL Cholesterol 04/01/2022 152  mg/dL Final    Comment: Risk category and Non-HDL  cholesterol goals:  Coronary heart disease (CHD)or equivalent (10-year risk of CHD >20%):  Non-HDL cholesterol goal     <130 mg/dL  Two or more CHD risk factors and 10-year risk of CHD <= 20%:  Non-HDL cholesterol goal     <160 mg/dL  0 to 1 CHD risk factor:  Non-HDL cholesterol goal     <190 mg/dL     Lab Visit on 04/01/2022   Component Date Value Ref Range Status    WBC 04/01/2022 5.86  3.90 - 12.70 K/uL Final    RBC 04/01/2022 4.68  4.00 - 5.40 M/uL Final    Hemoglobin 04/01/2022 13.8  12.0 - 16.0 g/dL Final    Hematocrit 04/01/2022 43.3  37.0 - 48.5 % Final    MCV 04/01/2022 93  82 - 98 fL Final    MCH 04/01/2022 29.5  27.0 - 31.0 pg Final    MCHC 04/01/2022 31.9 (A) 32.0 - 36.0 g/dL Final    RDW 04/01/2022 12.0  11.5 - 14.5 % Final    Platelets 04/01/2022 269  150 - 450 K/uL Final    MPV 04/01/2022 10.5  9.2 - 12.9 fL Final    Immature Granulocytes 04/01/2022 0.2  0.0 - 0.5 % Final    Gran # (ANC) 04/01/2022 3.1  1.8 - 7.7 K/uL Final    Immature Grans (Abs) 04/01/2022 0.01  0.00 - 0.04 K/uL Final    Comment: Mild elevation in immature granulocytes is non specific and   can be seen in a variety of conditions including stress response,   acute inflammation, trauma and pregnancy. Correlation with other   laboratory and clinical findings is essential.      Lymph # 04/01/2022 2.0  1.0 - 4.8 K/uL Final    Mono # 04/01/2022 0.5  0.3 - 1.0 K/uL Final    Eos # 04/01/2022 0.1  0.0 - 0.5 K/uL Final    Baso # 04/01/2022 0.03  0.00 - 0.20 K/uL Final    nRBC 04/01/2022 0  0 /100 WBC Final    Gran % 04/01/2022 53.4  38.0 - 73.0 % Final    Lymph % 04/01/2022 34.8  18.0 - 48.0 % Final    Mono % 04/01/2022 8.7  4.0 - 15.0 % Final    Eosinophil % 04/01/2022 2.4  0.0 - 8.0 % Final    Basophil % 04/01/2022 0.5  0.0 - 1.9 % Final    Differential Method 04/01/2022 Automated   Final    Sodium 04/01/2022 139  136 - 145 mmol/L Final    Potassium 04/01/2022 3.9  3.5 - 5.1 mmol/L Final    Chloride 04/01/2022 100   95 - 110 mmol/L Final    CO2 04/01/2022 28  23 - 29 mmol/L Final    Glucose 04/01/2022 77  70 - 110 mg/dL Final    BUN 04/01/2022 14  8 - 23 mg/dL Final    Creatinine 04/01/2022 0.8  0.5 - 1.4 mg/dL Final    Calcium 04/01/2022 10.1  8.7 - 10.5 mg/dL Final    Total Protein 04/01/2022 7.8  6.0 - 8.4 g/dL Final    Albumin 04/01/2022 4.3  3.5 - 5.2 g/dL Final    Total Bilirubin 04/01/2022 0.6  0.1 - 1.0 mg/dL Final    Comment: For infants and newborns, interpretation of results should be based  on gestational age, weight and in agreement with clinical  observations.    Premature Infant recommended reference ranges:  Up to 24 hours.............<8.0 mg/dL  Up to 48 hours............<12.0 mg/dL  3-5 days..................<15.0 mg/dL  6-29 days.................<15.0 mg/dL      Alkaline Phosphatase 04/01/2022 60  55 - 135 U/L Final    AST 04/01/2022 30  10 - 40 U/L Final    ALT 04/01/2022 23  10 - 44 U/L Final    Anion Gap 04/01/2022 11  8 - 16 mmol/L Final    eGFR if African American 04/01/2022 >60.0  >60 mL/min/1.73 m^2 Final    eGFR if non African American 04/01/2022 >60.0  >60 mL/min/1.73 m^2 Final    Comment: Calculation used to obtain the estimated glomerular filtration  rate (eGFR) is the CKD-EPI equation.       TSH 04/01/2022 1.016  0.400 - 4.000 uIU/mL Final    Hepatitis C Ab 04/01/2022 Negative  Negative Final    HIV 1/2 Ag/Ab 04/01/2022 Negative  Negative Final   Office Visit on 04/01/2022   Component Date Value Ref Range Status    Cologuard Result 04/12/2022 Negative  Negative Final    Comment:   NEGATIVE TEST RESULT. A negative Cologuard result indicates a low likelihood that a colorectal cancer (CRC) or advanced adenoma (adenomatous polyps with more advanced pre-malignant features)  is present. The chance that a person with a negative Cologuard test has a colorectal cancer is less than 1 in 1500 (negative predictive value >99.9%) or has an  advanced adenoma is less than  5.3% (negative  predictive value 94.7%). These data are based on a prospective cross-sectional study of 10,000 individuals at average risk for colorectal cancer who were screened with both Cologuard and colonoscopy. (Bethel Rodriguez al, N Engl J Med 2014;370(14):9203-8572) The normal value (reference range) for this assay is negative.    COLOGUARD RE-SCREENING RECOMMENDATION: Periodic colorectal cancer screening is an important part of preventive healthcare for asymptomatic individuals at average risk for colorectal cancer.  Following a negative Cologuard result, the American Cancer Society and U.S.                            Multi-Society Task Force screening guidelines recommend a Cologuard re-screening interval of 3 years.   References: American Cancer Society Guideline for Colorectal Cancer Screening: https://www.cancer.org/cancer/colon-rectal-cancer/njnzctjgg-pxxbrsqxe-hkybyxx/acs-recommendations.html.; Perry CROWELL, Brett DE LA CRUZ, Andrew RAMSAYK, Colorectal Cancer Screening: Recommendations for Physicians and Patients from the U.S. Multi-Society Task Force on Colorectal Cancer Screening , Am J Gastroenterology 2017; 112:5130-9462.    TEST DESCRIPTION: Composite algorithmic analysis of stool DNA-biomarkers with hemoglobin immunoassay.   Quantitative values of individual biomarkers are not reportable and are not associated with individual biomarker result reference ranges. Cologuard is intended for colorectal cancer screening of adults of either sex, 45 years or older, who are at average-risk for colorectal cancer (CRC). Cologuard has been approved for use by the U.S. FDA. The performance of Cologuard was                            established in a cross sectional study of average-risk adults aged 50-84. Cologuard performance in patients ages 45 to 49 years was estimated by sub-group analysis of near-age groups. Colonoscopies performed for a positive result may find as the most clinically significant lesion: colorectal cancer [4.0%],  advanced adenoma (including sessile serrated polyps greater than or equal to 1cm diameter) [20%] or non- advanced adenoma [31%]; or no colorectal neoplasia [45%]. These estimates are derived from a prospective cross-sectional screening study of 10,000 individuals at average risk for colorectal cancer who were screened with both Cologuard and colonoscopy. (Bethel Rodriguez al, N Engl J Med 2014;370(14):3618-2964.) Cologuard may produce a false negative or false positive result (no colorectal cancer or precancerous polyp present at colonoscopy follow up). A negative Cologuard test result does not guarantee the absence of CRC or advanced adenoma (pre-cancer). The current Cologuard                            screening interval is every 3 years. (American Cancer Society and U.S. Multi-Society Task Force). Cologuard performance data in a 10,000 patient pivotal study using colonoscopy as the reference method can be accessed at the following location: www.[employer]/results. Additional description of the Cologuard test process, warnings and precautions can be found at www.cologuard.com.         Mammo Digital Screening Bilat w/ Dragan  Narrative: Result:  Mammo Digital Screening Bilat w/ Dragan    History:  Patient is 62 y.o. and is seen for a screening mammogram.    Films Compared:  Prior images (if available) were compared.     Findings:   This procedure was performed using tomosynthesis.   Computer-aided detection was utilized in the interpretation of this   examination.    The breasts have scattered areas of fibroglandular density. There is no   evidence of suspicious masses, microcalcifications or architectural   distortion.  Impression:    No mammographic evidence of malignancy.    BI-RADS Category 1: Negative    Recommendation:  Routine screening mammogram in 1 year is recommended.    Your estimated lifetime risk of breast cancer (to age 85) based on   Tyrer-Cuzick risk assessment model is 6.71 %.  According to the  American   Cancer Society, patients with a lifetime breast cancer risk of 20% or   higher might benefit from supplemental screening tests. ??       FINDINGS:  The L1 to L4 vertebral bone mineral density is equal to 0.93 g/cm squared with a T score of -2.1.     The left femoral neck bone mineral density is equal to 0.88 g/cm squared with a T score of -1.1.     There is a 12.5% risk of a major osteoporotic fracture and a 1.0% risk of hip fracture in the next 10 years (FRAX).     Impression:     Osteopenia     Consider FDA approved medical therapies in postmenopausal women and men aged 50 years and older, based on the following:     *A hip or vertebral (clinical or morphometric) fracture  *T score less than or equal to -2.5 at the femoral neck or spine after appropriate evaluation to exclude secondary causes.  *Low bone mass -- also known as osteopenia (T score between -1.0 and -2.5 at the femoral neck or spine) and a 10 year probability of hip fracture greater than or equal to 3% or a 10 year probability of major osteoporosis-related fracture greater than or equal to 20% based on the [place]-adapted WHO algorithm.  *Clinicians judgment and/or patient preference may indicate treatment for people with 10 year fracture probabilities is above or below these levels.          Assessment/Plan:   1. Osteopenia, unspecified location  Reviewed DEXA scan test results showing osteopenia.  Patient is currently taking calcium with vitamin-D but not interested in starting bone strengthening medication like Fosamax.  Graded exercise regimen recommended.     2. EARL (generalized anxiety disorder)   Stable on Zoloft 50 mg daily           The reason for the audio only service rather than synchronous audio and video virtual visit was related to technical difficulties or patient preference/necessity.     Each patient to whom I provide medical services by telemedicine is:  (1) informed of the relationship between the physician and patient and  the respective role of any other health care provider with respect to management of the patient; and (2) notified that they may decline to receive medical services by telemedicine and may withdraw from such care at any time. Patient verbally consented to receive this service via voice-only telephone call.       This service was not originating from a related E/M service provided within the previous 7 days nor will  to an E/M service or procedure within the next 24 hours or my soonest available appointment.  Prevailing standard of care was able to be met in this audio-only visit.

## 2022-08-16 ENCOUNTER — PES CALL (OUTPATIENT)
Dept: ADMINISTRATIVE | Facility: CLINIC | Age: 62
End: 2022-08-16
Payer: MEDICARE

## 2022-08-17 ENCOUNTER — TELEPHONE (OUTPATIENT)
Dept: ADMINISTRATIVE | Facility: HOSPITAL | Age: 62
End: 2022-08-17
Payer: MEDICARE

## 2022-10-13 ENCOUNTER — OFFICE VISIT (OUTPATIENT)
Dept: HOME HEALTH SERVICES | Facility: CLINIC | Age: 62
End: 2022-10-13
Payer: MEDICARE

## 2022-10-13 VITALS
OXYGEN SATURATION: 98 % | HEART RATE: 77 BPM | HEIGHT: 69 IN | TEMPERATURE: 98 F | SYSTOLIC BLOOD PRESSURE: 109 MMHG | WEIGHT: 151 LBS | BODY MASS INDEX: 22.36 KG/M2 | DIASTOLIC BLOOD PRESSURE: 76 MMHG

## 2022-10-13 DIAGNOSIS — J37.0 CHRONIC LARYNGITIS: ICD-10-CM

## 2022-10-13 DIAGNOSIS — E78.5 HYPERLIPIDEMIA, UNSPECIFIED HYPERLIPIDEMIA TYPE: ICD-10-CM

## 2022-10-13 DIAGNOSIS — R49.0 DYSPHONIA: ICD-10-CM

## 2022-10-13 DIAGNOSIS — M72.0 PALMAR FASCIAL FIBROMATOSIS (DUPUYTREN): ICD-10-CM

## 2022-10-13 DIAGNOSIS — H93.19 TINNITUS, UNSPECIFIED LATERALITY: ICD-10-CM

## 2022-10-13 DIAGNOSIS — M85.80 OSTEOPENIA, UNSPECIFIED LOCATION: ICD-10-CM

## 2022-10-13 DIAGNOSIS — J30.9 ALLERGIC RHINITIS, UNSPECIFIED SEASONALITY, UNSPECIFIED TRIGGER: ICD-10-CM

## 2022-10-13 DIAGNOSIS — Z00.00 ENCOUNTER FOR PREVENTIVE HEALTH EXAMINATION: Primary | ICD-10-CM

## 2022-10-13 DIAGNOSIS — R00.2 PALPITATIONS: ICD-10-CM

## 2022-10-13 DIAGNOSIS — H43.12 VITREOUS HEMORRHAGE OF LEFT EYE: ICD-10-CM

## 2022-10-13 PROCEDURE — 3074F PR MOST RECENT SYSTOLIC BLOOD PRESSURE < 130 MM HG: ICD-10-PCS | Mod: CPTII,S$GLB,, | Performed by: NURSE PRACTITIONER

## 2022-10-13 PROCEDURE — 3078F DIAST BP <80 MM HG: CPT | Mod: CPTII,S$GLB,, | Performed by: NURSE PRACTITIONER

## 2022-10-13 PROCEDURE — G0439 PPPS, SUBSEQ VISIT: HCPCS | Mod: S$GLB,,, | Performed by: NURSE PRACTITIONER

## 2022-10-13 PROCEDURE — 3074F SYST BP LT 130 MM HG: CPT | Mod: CPTII,S$GLB,, | Performed by: NURSE PRACTITIONER

## 2022-10-13 PROCEDURE — 1160F PR REVIEW ALL MEDS BY PRESCRIBER/CLIN PHARMACIST DOCUMENTED: ICD-10-PCS | Mod: CPTII,S$GLB,, | Performed by: NURSE PRACTITIONER

## 2022-10-13 PROCEDURE — 1160F RVW MEDS BY RX/DR IN RCRD: CPT | Mod: CPTII,S$GLB,, | Performed by: NURSE PRACTITIONER

## 2022-10-13 PROCEDURE — 1159F PR MEDICATION LIST DOCUMENTED IN MEDICAL RECORD: ICD-10-PCS | Mod: CPTII,S$GLB,, | Performed by: NURSE PRACTITIONER

## 2022-10-13 PROCEDURE — G0439 PR MEDICARE ANNUAL WELLNESS SUBSEQUENT VISIT: ICD-10-PCS | Mod: S$GLB,,, | Performed by: NURSE PRACTITIONER

## 2022-10-13 PROCEDURE — 1159F MED LIST DOCD IN RCRD: CPT | Mod: CPTII,S$GLB,, | Performed by: NURSE PRACTITIONER

## 2022-10-13 PROCEDURE — 99499 UNLISTED E&M SERVICE: CPT | Mod: S$GLB,,, | Performed by: NURSE PRACTITIONER

## 2022-10-13 PROCEDURE — 3078F PR MOST RECENT DIASTOLIC BLOOD PRESSURE < 80 MM HG: ICD-10-PCS | Mod: CPTII,S$GLB,, | Performed by: NURSE PRACTITIONER

## 2022-10-13 RX ORDER — VITAMIN B COMPLEX
2500 TABLET ORAL DAILY
COMMUNITY

## 2022-10-13 RX ORDER — ACETAMINOPHEN 500 MG
5000 TABLET ORAL DAILY
COMMUNITY

## 2022-10-13 RX ORDER — IBUPROFEN 100 MG/5ML
500 SUSPENSION, ORAL (FINAL DOSE FORM) ORAL DAILY
COMMUNITY

## 2022-10-13 RX ORDER — ZINC GLUCONATE 50 MG
50 TABLET ORAL DAILY
COMMUNITY

## 2022-10-13 NOTE — PROGRESS NOTES
"Latasha Montes presented for Medicare AWV today. The following components were reviewed and updated:    Medical history  Family History  Social history  Allergies and Current Medications  Health Risk Assessment  Health Maintenance  Care Team    **See Completed Assessments for Annual Wellness visit with in the encounter summary    The following assessments were completed:  Depression Screening  Cognitive function Screening      Timed Get Up Test  Whisper Test    Vitals:    10/13/22 0909   BP: 109/76   Pulse: 77   Temp: 98.2 °F (36.8 °C)   TempSrc: Temporal   SpO2: 98%   Weight: 68.5 kg (151 lb)   Height: 5' 9" (1.753 m)     Body mass index is 22.3 kg/m².   ]    Physical Exam  Vitals reviewed.   Constitutional:       Appearance: Normal appearance.   HENT:      Head: Normocephalic and atraumatic.   Cardiovascular:      Rate and Rhythm: Normal rate and regular rhythm.      Pulses: Normal pulses.      Heart sounds: Normal heart sounds.   Pulmonary:      Effort: Pulmonary effort is normal.      Breath sounds: Normal breath sounds.   Musculoskeletal:         General: Normal range of motion.      Cervical back: Normal range of motion and neck supple.      Comments: Contracted right 4th digit of the hand   Skin:     General: Skin is warm and dry.   Neurological:      Mental Status: She is alert and oriented to person, place, and time.   Psychiatric:         Mood and Affect: Mood normal.         Behavior: Behavior normal.        Outpatient Medications Marked as Taking for the 10/13/22 encounter (Office Visit) with ALLYSSA Fish   Medication Sig Dispense Refill    ascorbic acid, vitamin C, (VITAMIN C) 1000 MG tablet Take 500 mg by mouth once daily.      calcium cit/Mgox/vit D3/B6/min (CALCIUM-MAG-VIT B6-D3-MINERALS ORAL) Take 1 tablet by mouth once daily.      cartilage/collagen/bor/hyalur (JOINT HEALTH ORAL) Take 1 tablet by mouth Daily.      cholecalciferol, vitamin D3, (VITAMIN D3) 125 mcg (5,000 unit) Tab Take " 5,000 Units by mouth once daily.      cinnamon bark (CINNAMON ORAL) Take 1 tablet by mouth Daily.      COLLAGEN-BIOTIN-ASCORBIC ACID ORAL Take 1 tablet by mouth every other day.      cyanocobalamin, vitamin B-12, (VITAMIN B-12) 2,500 mcg Subl Place 2,500 mcg under the tongue once daily.      GREEN TEA EXTRACT ORAL Take 1 tablet by mouth once daily.      loratadine 10 mg Cap Take 1 tablet by mouth once daily.      sertraline (ZOLOFT) 50 MG tablet Take 1 tablet (50 mg total) by mouth every evening. 90 tablet 1    TURMERIC ORAL Take 1 tablet by mouth once daily.      zinc gluconate 50 mg tablet Take 50 mg by mouth once daily.          Diagnoses and health risks identified today and associated recommendations/orders:  1. Encounter for preventive health examination  Medicare awv complete. Health maintenance:  tetanus, shingles, and flu vaccines due-encouraged patient to obtain.     2. Vitreous hemorrhage of left eye  Chronic. Seen by [facility] ophthalmology in the past. Patient informed to have eye exam done at least once a year. Due for follow up. Referral placed to ophthalmology.   - Ambulatory referral/consult to Ophthalmology; Future    3. Palpitations  Patient states she has had this for years and had a holter monitor in the past. Patient c/o heart fluttering feeling every other month. Occurs even with sitting.     4. Palmar fascial fibromatosis (dupuytren)  Patient had surgery 6/1/2022. Recommend f/u with surgeon Dr. Everett Garcia.     5. Hyperlipidemia, unspecified hyperlipidemia type  Lab Results   Component Value Date    CHOL 235 (H) 04/01/2022     Lab Results   Component Value Date    HDL 83 (H) 04/01/2022     Lab Results   Component Value Date    LDLCALC 140.6 04/01/2022     Lab Results   Component Value Date    TRIG 57 04/01/2022     Lab Results   Component Value Date    CHOLHDL 35.3 04/01/2022    Chronic. Recommend mediterranean diet and exercise. Follow up with PCP.      6. Chronic laryngitis  Chronic and stable on  current management. See med list above. Follow up with PCP.      7. Osteopenia, unspecified location  Chronic and stable on current management. Continue vitamin d, calcium in the diet, and weight bearing exercise. See med list above. Follow up with PCP.       8. Dysphonia  Went to ST. Completed treatment.     9. Tinnitus, unspecified laterality  Chronic and stable on current management. 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 Follow up with PCP.      10. Allergic rhinitis, unspecified seasonality, unspecified trigger  Chronic and stable on current management. On claritin. See med list above. Follow up with PCP.          Provided Latasha with a 5-10 year written screening schedule and personal prevention plan. Recommendations were developed using the USPSTF age appropriate recommendations. Education, counseling, and referrals were provided as needed.  After Visit Summary printed and given to patient which includes a list of additional screenings\tests needed.    Follow up in about 1 year (around 10/13/2023) for annual wellness visit.      ALLYSSA Fish      I offered to discuss advanced care planning, including how to pick a person who would make decisions for you if you were unable to make them for yourself, called a health care power of , and what kind of decisions you might make such as use of life sustaining treatments such as ventilators and tube feeding when faced with a life limiting illness recorded on a living will that they will need to know. (How you want to be cared for as you near the end of your natural life)     X Patient is interested in learning more about how to make advanced directives.  I provided them paperwork and offered to discuss this with them.

## 2022-10-13 NOTE — Clinical Note
Medicare awv complete. Health maintenance:  tetanus, shingles, and flu vaccines due-encouraged patient to obtain.   2. Vitreous hemorrhage of left eye Chronic. Seen by [facility] ophthalmology in the past. Patient informed to have eye exam done at least once a year. Due for follow up. Referral placed to ophthalmology.

## 2022-10-13 NOTE — PATIENT INSTRUCTIONS
Counseling and Referral of Other Preventative  (Italic type indicates deductible and co-insurance are waived)    Patient Name: Latasha Montes  Today's Date: 10/13/2022    Health Maintenance       Date Due Completion Date    TETANUS VACCINE Never done ---    Shingles Vaccine (1 of 2) Never done ---    Influenza Vaccine (1) Never done ---    COVID-19 Vaccine (1) 10/13/2023 (Originally 1960) ---    Mammogram 04/25/2023 4/25/2022    Colorectal Cancer Screening 04/12/2025 4/12/2022    Lipid Panel 04/01/2027 4/1/2022    Cervical Cancer Screening 04/05/2027 4/5/2022        No orders of the defined types were placed in this encounter.    The following information is provided to all patients.  This information is to help you find resources for any of the problems found today that may be affecting your health:                Living healthy guide: www.[facility].[place].[facility]      Understanding Diabetes: www.diabetes.org      Eating healthy: www.cdc.gov/healthyweight      CDC home safety checklist: www.cdc.gov/steadi/patient.html      Agency on Aging: www.goea.[place].[facility]      Alcoholics anonymous (AA): www.aa.org      Physical Activity: www.alison.nih.gov/ch6atjt      Tobacco use: www.quitwithusla.org

## 2022-10-14 ENCOUNTER — PATIENT MESSAGE (OUTPATIENT)
Dept: INTERNAL MEDICINE | Facility: CLINIC | Age: 62
End: 2022-10-14
Payer: MEDICARE

## 2023-03-14 ENCOUNTER — OFFICE VISIT (OUTPATIENT)
Dept: INTERNAL MEDICINE | Facility: CLINIC | Age: 63
End: 2023-03-14
Payer: MEDICARE

## 2023-03-14 VITALS
DIASTOLIC BLOOD PRESSURE: 64 MMHG | BODY MASS INDEX: 22.79 KG/M2 | HEIGHT: 69 IN | TEMPERATURE: 97 F | HEART RATE: 85 BPM | WEIGHT: 153.88 LBS | OXYGEN SATURATION: 98 % | SYSTOLIC BLOOD PRESSURE: 110 MMHG

## 2023-03-14 DIAGNOSIS — R10.9 ABDOMINAL DISCOMFORT: ICD-10-CM

## 2023-03-14 DIAGNOSIS — F41.1 GAD (GENERALIZED ANXIETY DISORDER): Primary | ICD-10-CM

## 2023-03-14 DIAGNOSIS — R19.5 LOOSE STOOLS: ICD-10-CM

## 2023-03-14 PROCEDURE — 3008F BODY MASS INDEX DOCD: CPT | Mod: CPTII,S$GLB,, | Performed by: NURSE PRACTITIONER

## 2023-03-14 PROCEDURE — 3078F DIAST BP <80 MM HG: CPT | Mod: CPTII,S$GLB,, | Performed by: NURSE PRACTITIONER

## 2023-03-14 PROCEDURE — 3288F FALL RISK ASSESSMENT DOCD: CPT | Mod: CPTII,S$GLB,, | Performed by: NURSE PRACTITIONER

## 2023-03-14 PROCEDURE — 1159F PR MEDICATION LIST DOCUMENTED IN MEDICAL RECORD: ICD-10-PCS | Mod: CPTII,S$GLB,, | Performed by: NURSE PRACTITIONER

## 2023-03-14 PROCEDURE — 3288F PR FALLS RISK ASSESSMENT DOCUMENTED: ICD-10-PCS | Mod: CPTII,S$GLB,, | Performed by: NURSE PRACTITIONER

## 2023-03-14 PROCEDURE — 3074F PR MOST RECENT SYSTOLIC BLOOD PRESSURE < 130 MM HG: ICD-10-PCS | Mod: CPTII,S$GLB,, | Performed by: NURSE PRACTITIONER

## 2023-03-14 PROCEDURE — 99999 PR PBB SHADOW E&M-EST. PATIENT-LVL IV: CPT | Mod: PBBFAC,,, | Performed by: NURSE PRACTITIONER

## 2023-03-14 PROCEDURE — 99214 OFFICE O/P EST MOD 30 MIN: CPT | Mod: S$GLB,,, | Performed by: NURSE PRACTITIONER

## 2023-03-14 PROCEDURE — 1101F PR PT FALLS ASSESS DOC 0-1 FALLS W/OUT INJ PAST YR: ICD-10-PCS | Mod: CPTII,S$GLB,, | Performed by: NURSE PRACTITIONER

## 2023-03-14 PROCEDURE — 1101F PT FALLS ASSESS-DOCD LE1/YR: CPT | Mod: CPTII,S$GLB,, | Performed by: NURSE PRACTITIONER

## 2023-03-14 PROCEDURE — 3078F PR MOST RECENT DIASTOLIC BLOOD PRESSURE < 80 MM HG: ICD-10-PCS | Mod: CPTII,S$GLB,, | Performed by: NURSE PRACTITIONER

## 2023-03-14 PROCEDURE — 99999 PR PBB SHADOW E&M-EST. PATIENT-LVL IV: ICD-10-PCS | Mod: PBBFAC,,, | Performed by: NURSE PRACTITIONER

## 2023-03-14 PROCEDURE — 99214 PR OFFICE/OUTPT VISIT, EST, LEVL IV, 30-39 MIN: ICD-10-PCS | Mod: S$GLB,,, | Performed by: NURSE PRACTITIONER

## 2023-03-14 PROCEDURE — 3008F PR BODY MASS INDEX (BMI) DOCUMENTED: ICD-10-PCS | Mod: CPTII,S$GLB,, | Performed by: NURSE PRACTITIONER

## 2023-03-14 PROCEDURE — 3074F SYST BP LT 130 MM HG: CPT | Mod: CPTII,S$GLB,, | Performed by: NURSE PRACTITIONER

## 2023-03-14 PROCEDURE — 1159F MED LIST DOCD IN RCRD: CPT | Mod: CPTII,S$GLB,, | Performed by: NURSE PRACTITIONER

## 2023-03-14 RX ORDER — SERTRALINE HYDROCHLORIDE 25 MG/1
25 TABLET, FILM COATED ORAL DAILY
Qty: 30 TABLET | Refills: 11 | Status: SHIPPED | OUTPATIENT
Start: 2023-03-14 | End: 2023-04-03
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
## 2023-03-15 ENCOUNTER — LAB VISIT (OUTPATIENT)
Dept: LAB | Facility: HOSPITAL | Age: 63
End: 2023-03-15
Payer: MEDICARE

## 2023-03-15 DIAGNOSIS — R10.9 ABDOMINAL DISCOMFORT: ICD-10-CM

## 2023-03-15 PROCEDURE — 87046 STOOL CULTR AEROBIC BACT EA: CPT | Mod: 59 | Performed by: NURSE PRACTITIONER

## 2023-03-15 PROCEDURE — 87338 HPYLORI STOOL AG IA: CPT | Performed by: NURSE PRACTITIONER

## 2023-03-15 PROCEDURE — 87329 GIARDIA AG IA: CPT | Performed by: NURSE PRACTITIONER

## 2023-03-15 PROCEDURE — 87209 SMEAR COMPLEX STAIN: CPT | Performed by: NURSE PRACTITIONER

## 2023-03-15 PROCEDURE — 87425 ROTAVIRUS AG IA: CPT | Performed by: NURSE PRACTITIONER

## 2023-03-15 PROCEDURE — 87427 SHIGA-LIKE TOXIN AG IA: CPT | Mod: 59 | Performed by: NURSE PRACTITIONER

## 2023-03-15 PROCEDURE — 87177 OVA AND PARASITES SMEARS: CPT | Performed by: NURSE PRACTITIONER

## 2023-03-15 PROCEDURE — 87045 FECES CULTURE AEROBIC BACT: CPT | Performed by: NURSE PRACTITIONER

## 2023-03-16 LAB
CRYPTOSP AG STL QL IA: NEGATIVE
G LAMBLIA AG STL QL IA: NEGATIVE
RV AG STL QL IA.RAPID: NEGATIVE

## 2023-03-17 LAB
E COLI SXT1 STL QL IA: NEGATIVE
E COLI SXT2 STL QL IA: NEGATIVE

## 2023-03-20 ENCOUNTER — TELEPHONE (OUTPATIENT)
Dept: INTERNAL MEDICINE | Facility: CLINIC | Age: 63
End: 2023-03-20
Payer: MEDICARE

## 2023-03-20 LAB — BACTERIA STL CULT: NORMAL

## 2023-03-20 NOTE — TELEPHONE ENCOUNTER
----- Message from Maricruz Lindsay sent at 3/20/2023  8:37 AM CDT -----  Contact: [phone]  Patient is calling for specimen results. Please call patient at [phone]. Thanks!       Pt stated if she is unable to be reached the results can be explained on her voicemail or sent via message or text. Thanks

## 2023-03-20 NOTE — TELEPHONE ENCOUNTER
----- Message from Maricruz Lindsay sent at 3/20/2023  8:37 AM CDT -----  Contact: [phone]  Patient is calling for specimen results. Please call patient at [phone]. Thanks!       Pt stated if she is unable to be reached the results can be explained on her voicemail or sent via message or text. Thanks

## 2023-03-20 NOTE — TELEPHONE ENCOUNTER
Spoke with patient regarding lab results patient stated she understand.//KELBY                      ----- Message from Maricruz Lindsay sent at 3/20/2023  8:37 AM CDT -----  Contact: [phone]  Patient is calling for specimen results. Please call patient at [phone]. Thanks!       Pt stated if she is unable to be reached the results can be explained on her voicemail or sent via message or text. Thanks

## 2023-03-23 LAB
H PYLORI AG STL QL IA: NOT DETECTED
SPECIMEN SOURCE: NORMAL

## 2023-03-26 LAB — O+P STL MICRO: NORMAL

## 2023-03-27 ENCOUNTER — TELEPHONE (OUTPATIENT)
Dept: ADMINISTRATIVE | Facility: HOSPITAL | Age: 63
End: 2023-03-27
Payer: MEDICARE

## 2023-03-29 ENCOUNTER — TELEPHONE (OUTPATIENT)
Dept: INTERNAL MEDICINE | Facility: CLINIC | Age: 63
End: 2023-03-29
Payer: MEDICARE

## 2023-03-29 DIAGNOSIS — Z12.31 ENCOUNTER FOR SCREENING MAMMOGRAM FOR MALIGNANT NEOPLASM OF BREAST: Primary | ICD-10-CM

## 2023-03-29 NOTE — TELEPHONE ENCOUNTER
----- Message from Deloris Lira sent at 3/29/2023 10:24 AM CDT -----  Pt is requesting an order for a mammogram and bone density test. Madyson leo

## 2023-04-03 ENCOUNTER — LAB VISIT (OUTPATIENT)
Dept: LAB | Facility: HOSPITAL | Age: 63
End: 2023-04-03
Attending: FAMILY MEDICINE
Payer: MEDICARE

## 2023-04-03 ENCOUNTER — OFFICE VISIT (OUTPATIENT)
Dept: INTERNAL MEDICINE | Facility: CLINIC | Age: 63
End: 2023-04-03
Payer: MEDICARE

## 2023-04-03 VITALS
HEART RATE: 79 BPM | SYSTOLIC BLOOD PRESSURE: 112 MMHG | RESPIRATION RATE: 18 BRPM | DIASTOLIC BLOOD PRESSURE: 72 MMHG | OXYGEN SATURATION: 99 % | WEIGHT: 150.81 LBS | BODY MASS INDEX: 22.34 KG/M2 | HEIGHT: 69 IN

## 2023-04-03 DIAGNOSIS — M85.80 OSTEOPENIA, UNSPECIFIED LOCATION: ICD-10-CM

## 2023-04-03 DIAGNOSIS — Z13.6 SCREENING FOR CARDIOVASCULAR CONDITION: ICD-10-CM

## 2023-04-03 DIAGNOSIS — M47.26 OSTEOARTHRITIS OF SPINE WITH RADICULOPATHY, LUMBAR REGION: ICD-10-CM

## 2023-04-03 DIAGNOSIS — M23.8X2 CREPITUS OF BOTH KNEE JOINTS: ICD-10-CM

## 2023-04-03 DIAGNOSIS — F41.1 GAD (GENERALIZED ANXIETY DISORDER): Primary | ICD-10-CM

## 2023-04-03 DIAGNOSIS — M23.8X1 CREPITUS OF BOTH KNEE JOINTS: ICD-10-CM

## 2023-04-03 DIAGNOSIS — F41.1 GAD (GENERALIZED ANXIETY DISORDER): ICD-10-CM

## 2023-04-03 PROBLEM — J30.9 ALLERGIC RHINITIS, UNSPECIFIED: Status: RESOLVED | Noted: 2022-10-13 | Resolved: 2023-04-03

## 2023-04-03 PROBLEM — H43.12 VITREOUS HEMORRHAGE OF LEFT EYE: Status: RESOLVED | Noted: 2022-10-13 | Resolved: 2023-04-03

## 2023-04-03 LAB
ALBUMIN SERPL BCP-MCNC: 4 G/DL (ref 3.5–5.2)
ALP SERPL-CCNC: 56 U/L (ref 55–135)
ALT SERPL W/O P-5'-P-CCNC: 19 U/L (ref 10–44)
ANION GAP SERPL CALC-SCNC: 9 MMOL/L (ref 8–16)
AST SERPL-CCNC: 24 U/L (ref 10–40)
BASOPHILS # BLD AUTO: 0.03 K/UL (ref 0–0.2)
BASOPHILS NFR BLD: 0.4 % (ref 0–1.9)
BILIRUB SERPL-MCNC: 0.6 MG/DL (ref 0.1–1)
BUN SERPL-MCNC: 10 MG/DL (ref 8–23)
CALCIUM SERPL-MCNC: 9.8 MG/DL (ref 8.7–10.5)
CHLORIDE SERPL-SCNC: 105 MMOL/L (ref 95–110)
CHOLEST SERPL-MCNC: 239 MG/DL (ref 120–199)
CHOLEST/HDLC SERPL: 3 {RATIO} (ref 2–5)
CO2 SERPL-SCNC: 26 MMOL/L (ref 23–29)
CREAT SERPL-MCNC: 0.7 MG/DL (ref 0.5–1.4)
DIFFERENTIAL METHOD: ABNORMAL
EOSINOPHIL # BLD AUTO: 0.2 K/UL (ref 0–0.5)
EOSINOPHIL NFR BLD: 2.1 % (ref 0–8)
ERYTHROCYTE [DISTWIDTH] IN BLOOD BY AUTOMATED COUNT: 12 % (ref 11.5–14.5)
EST. GFR  (NO RACE VARIABLE): >60 ML/MIN/1.73 M^2
GLUCOSE SERPL-MCNC: 80 MG/DL (ref 70–110)
HCT VFR BLD AUTO: 42.7 % (ref 37–48.5)
HDLC SERPL-MCNC: 79 MG/DL (ref 40–75)
HDLC SERPL: 33.1 % (ref 20–50)
HGB BLD-MCNC: 13.6 G/DL (ref 12–16)
IMM GRANULOCYTES # BLD AUTO: 0.02 K/UL (ref 0–0.04)
IMM GRANULOCYTES NFR BLD AUTO: 0.3 % (ref 0–0.5)
LDLC SERPL CALC-MCNC: 145 MG/DL (ref 63–159)
LYMPHOCYTES # BLD AUTO: 2 K/UL (ref 1–4.8)
LYMPHOCYTES NFR BLD: 27.9 % (ref 18–48)
MCH RBC QN AUTO: 29 PG (ref 27–31)
MCHC RBC AUTO-ENTMCNC: 31.9 G/DL (ref 32–36)
MCV RBC AUTO: 91 FL (ref 82–98)
MONOCYTES # BLD AUTO: 0.6 K/UL (ref 0.3–1)
MONOCYTES NFR BLD: 9 % (ref 4–15)
NEUTROPHILS # BLD AUTO: 4.3 K/UL (ref 1.8–7.7)
NEUTROPHILS NFR BLD: 60.3 % (ref 38–73)
NONHDLC SERPL-MCNC: 160 MG/DL
NRBC BLD-RTO: 0 /100 WBC
PLATELET # BLD AUTO: 263 K/UL (ref 150–450)
PMV BLD AUTO: 10 FL (ref 9.2–12.9)
POTASSIUM SERPL-SCNC: 4.8 MMOL/L (ref 3.5–5.1)
PROT SERPL-MCNC: 7.3 G/DL (ref 6–8.4)
RBC # BLD AUTO: 4.69 M/UL (ref 4–5.4)
SODIUM SERPL-SCNC: 140 MMOL/L (ref 136–145)
TRIGL SERPL-MCNC: 75 MG/DL (ref 30–150)
WBC # BLD AUTO: 7.1 K/UL (ref 3.9–12.7)

## 2023-04-03 PROCEDURE — 99214 OFFICE O/P EST MOD 30 MIN: CPT | Mod: S$GLB,,, | Performed by: FAMILY MEDICINE

## 2023-04-03 PROCEDURE — 3078F DIAST BP <80 MM HG: CPT | Mod: CPTII,S$GLB,, | Performed by: FAMILY MEDICINE

## 2023-04-03 PROCEDURE — 36415 COLL VENOUS BLD VENIPUNCTURE: CPT | Performed by: FAMILY MEDICINE

## 2023-04-03 PROCEDURE — 80061 LIPID PANEL: CPT | Performed by: FAMILY MEDICINE

## 2023-04-03 PROCEDURE — 99214 PR OFFICE/OUTPT VISIT, EST, LEVL IV, 30-39 MIN: ICD-10-PCS | Mod: S$GLB,,, | Performed by: FAMILY MEDICINE

## 2023-04-03 PROCEDURE — 1101F PT FALLS ASSESS-DOCD LE1/YR: CPT | Mod: CPTII,S$GLB,, | Performed by: FAMILY MEDICINE

## 2023-04-03 PROCEDURE — 1159F PR MEDICATION LIST DOCUMENTED IN MEDICAL RECORD: ICD-10-PCS | Mod: CPTII,S$GLB,, | Performed by: FAMILY MEDICINE

## 2023-04-03 PROCEDURE — 3288F PR FALLS RISK ASSESSMENT DOCUMENTED: ICD-10-PCS | Mod: CPTII,S$GLB,, | Performed by: FAMILY MEDICINE

## 2023-04-03 PROCEDURE — 3074F SYST BP LT 130 MM HG: CPT | Mod: CPTII,S$GLB,, | Performed by: FAMILY MEDICINE

## 2023-04-03 PROCEDURE — 1159F MED LIST DOCD IN RCRD: CPT | Mod: CPTII,S$GLB,, | Performed by: FAMILY MEDICINE

## 2023-04-03 PROCEDURE — 85025 COMPLETE CBC W/AUTO DIFF WBC: CPT | Performed by: FAMILY MEDICINE

## 2023-04-03 PROCEDURE — 3288F FALL RISK ASSESSMENT DOCD: CPT | Mod: CPTII,S$GLB,, | Performed by: FAMILY MEDICINE

## 2023-04-03 PROCEDURE — 3008F BODY MASS INDEX DOCD: CPT | Mod: CPTII,S$GLB,, | Performed by: FAMILY MEDICINE

## 2023-04-03 PROCEDURE — 1101F PR PT FALLS ASSESS DOC 0-1 FALLS W/OUT INJ PAST YR: ICD-10-PCS | Mod: CPTII,S$GLB,, | Performed by: FAMILY MEDICINE

## 2023-04-03 PROCEDURE — 3008F PR BODY MASS INDEX (BMI) DOCUMENTED: ICD-10-PCS | Mod: CPTII,S$GLB,, | Performed by: FAMILY MEDICINE

## 2023-04-03 PROCEDURE — 3078F PR MOST RECENT DIASTOLIC BLOOD PRESSURE < 80 MM HG: ICD-10-PCS | Mod: CPTII,S$GLB,, | Performed by: FAMILY MEDICINE

## 2023-04-03 PROCEDURE — 3074F PR MOST RECENT SYSTOLIC BLOOD PRESSURE < 130 MM HG: ICD-10-PCS | Mod: CPTII,S$GLB,, | Performed by: FAMILY MEDICINE

## 2023-04-03 PROCEDURE — 1160F RVW MEDS BY RX/DR IN RCRD: CPT | Mod: CPTII,S$GLB,, | Performed by: FAMILY MEDICINE

## 2023-04-03 PROCEDURE — 99999 PR PBB SHADOW E&M-EST. PATIENT-LVL IV: ICD-10-PCS | Mod: PBBFAC,,, | Performed by: FAMILY MEDICINE

## 2023-04-03 PROCEDURE — 80053 COMPREHEN METABOLIC PANEL: CPT | Performed by: FAMILY MEDICINE

## 2023-04-03 PROCEDURE — 99999 PR PBB SHADOW E&M-EST. PATIENT-LVL IV: CPT | Mod: PBBFAC,,, | Performed by: FAMILY MEDICINE

## 2023-04-03 PROCEDURE — 1160F PR REVIEW ALL MEDS BY PRESCRIBER/CLIN PHARMACIST DOCUMENTED: ICD-10-PCS | Mod: CPTII,S$GLB,, | Performed by: FAMILY MEDICINE

## 2023-04-03 PROCEDURE — 84443 ASSAY THYROID STIM HORMONE: CPT | Performed by: FAMILY MEDICINE

## 2023-04-03 RX ORDER — SERTRALINE HYDROCHLORIDE 50 MG/1
50 TABLET, FILM COATED ORAL NIGHTLY
Qty: 90 TABLET | Refills: 1 | Status: SHIPPED | OUTPATIENT
Start: 2023-04-03 | End: 2023-10-09 | Stop reason: SDUPTHER

## 2023-04-03 NOTE — PROGRESS NOTES
"Subjective:       Patient ID: Latasha Montes is a 63 y.o. female.    Chief Complaint: Annual Exam    63-year-old female patient with Patient Active Problem List:     EARL (generalized anxiety disorder)     Tinnitus     Dysphonia     Palmar fascial fibromatosis (dupuytren)     Chronic laryngitis     Palpitations     Osteopenia     Hyperlipidemia  Patient had increased anxiety for which Zoloft has been increased from 50-75 mg but was not feeling well and has went back to 50 mg daily  Has been feeling better now and currently doing counseling  Has been watching her diet and staying physically active  Reports crunching sensation to bilateral knees but denies any falls    Review of Systems   Constitutional:  Negative for fatigue.   Eyes:  Negative for visual disturbance.   Respiratory:  Negative for shortness of breath.    Cardiovascular:  Negative for chest pain and leg swelling.   Gastrointestinal:  Negative for abdominal pain, nausea and vomiting.   Musculoskeletal:  Positive for back pain and myalgias. Negative for arthralgias.   Skin:  Negative for rash.   Neurological:  Negative for light-headedness and headaches.   Psychiatric/Behavioral:  Negative for dysphoric mood and sleep disturbance. The patient is nervous/anxious.        /72 (BP Location: Left arm, Patient Position: Sitting, BP Method: Medium (Manual))   Pulse 79   Resp 18   Ht 5' 9" (1.753 m)   Wt 68.4 kg (150 lb 12.7 oz)   SpO2 99%   BMI 22.27 kg/m²   Objective:      Physical Exam  Constitutional:       Appearance: She is well-developed.   HENT:      Head: Normocephalic and atraumatic.   Cardiovascular:      Rate and Rhythm: Normal rate and regular rhythm.      Heart sounds: Normal heart sounds. No murmur heard.  Pulmonary:      Effort: Pulmonary effort is normal.      Breath sounds: Normal breath sounds. No wheezing.   Abdominal:      General: Bowel sounds are normal.      Palpations: Abdomen is soft.      Tenderness: There is no abdominal " tenderness.   Musculoskeletal:      Comments: Positive for crepitus to bilateral knees   Skin:     General: Skin is warm and dry.      Findings: No rash.   Neurological:      Mental Status: She is alert and oriented to person, place, and time.   Psychiatric:         Mood and Affect: Mood normal.         Assessment/Plan:   1. EARL (generalized anxiety disorder)  -     CBC Auto Differential; Future; Expected date: 04/03/2023  -     Comprehensive Metabolic Panel; Future; Expected date: 04/03/2023  -     TSH; Future; Expected date: 04/03/2023  -     sertraline (ZOLOFT) 50 MG tablet; Take 1 tablet (50 mg total) by mouth every evening.  Dispense: 90 tablet; Refill: 1  Clinically doing well on Zoloft 50 mg and patient was advised to continue counseling    2. Osteopenia, unspecified location  Graded exercise regimen recommended  Continue calcium with vitamin-D supplements    3. Screening for cardiovascular condition  -     Lipid Panel; Future; Expected date: 04/03/2023    4. Osteoarthritis of spine with radiculopathy, lumbar region  -     CBC Auto Differential; Future; Expected date: 04/03/2023  Graded exercise regimen recommended    5. Crepitus of both knee joints  -     CBC Auto Differential; Future; Expected date: 04/03/2023  Graded exercise regimen recommended

## 2023-04-04 LAB — TSH SERPL DL<=0.005 MIU/L-ACNC: 0.97 UIU/ML (ref 0.4–4)

## 2023-04-10 ENCOUNTER — TELEPHONE (OUTPATIENT)
Dept: INTERNAL MEDICINE | Facility: CLINIC | Age: 63
End: 2023-04-10
Payer: MEDICARE

## 2023-04-10 NOTE — TELEPHONE ENCOUNTER
----- Message from Roselia Hernandez sent at 4/10/2023 11:56 AM CDT -----  Regarding: pt call back  Name of Who is Calling: SAMANTHA LEAL [[id number]]        What is the request in detail: Pt is returning call, may have been regarding test results. Please advise.         Can the clinic reply by [facility]: no           [facility]: [phone]

## 2023-04-10 NOTE — TELEPHONE ENCOUNTER
Spoke with patient regarding lab results. Patient voiced no further questions or concerns./minor

## 2023-05-01 ENCOUNTER — HOSPITAL ENCOUNTER (OUTPATIENT)
Dept: RADIOLOGY | Facility: HOSPITAL | Age: 63
Discharge: HOME | End: 2023-05-01
Attending: FAMILY MEDICINE
Payer: MEDICARE

## 2023-05-01 VITALS — HEIGHT: 69 IN | BODY MASS INDEX: 22.34 KG/M2 | WEIGHT: 150.81 LBS

## 2023-05-01 DIAGNOSIS — Z12.31 ENCOUNTER FOR SCREENING MAMMOGRAM FOR MALIGNANT NEOPLASM OF BREAST: ICD-10-CM

## 2023-05-01 PROCEDURE — 77063 BREAST TOMOSYNTHESIS BI: CPT | Mod: 26,,, | Performed by: RADIOLOGY

## 2023-05-01 PROCEDURE — 77067 SCR MAMMO BI INCL CAD: CPT | Mod: TC

## 2023-05-01 PROCEDURE — 77067 MAMMO DIGITAL SCREENING BILAT WITH TOMO: ICD-10-PCS | Mod: 26,,, | Performed by: RADIOLOGY

## 2023-05-01 PROCEDURE — 77063 MAMMO DIGITAL SCREENING BILAT WITH TOMO: ICD-10-PCS | Mod: 26,,, | Performed by: RADIOLOGY

## 2023-05-01 PROCEDURE — 77067 SCR MAMMO BI INCL CAD: CPT | Mod: 26,,, | Performed by: RADIOLOGY

## 2023-05-23 ENCOUNTER — OFFICE VISIT (OUTPATIENT)
Dept: HOME HEALTH SERVICES | Facility: CLINIC | Age: 63
End: 2023-05-23
Payer: MEDICARE

## 2023-05-23 VITALS
SYSTOLIC BLOOD PRESSURE: 100 MMHG | WEIGHT: 150 LBS | OXYGEN SATURATION: 98 % | HEIGHT: 69 IN | TEMPERATURE: 98 F | HEART RATE: 94 BPM | BODY MASS INDEX: 22.22 KG/M2 | DIASTOLIC BLOOD PRESSURE: 66 MMHG

## 2023-05-23 DIAGNOSIS — J37.0 CHRONIC LARYNGITIS: ICD-10-CM

## 2023-05-23 DIAGNOSIS — R00.2 PALPITATIONS: ICD-10-CM

## 2023-05-23 DIAGNOSIS — M72.0 PALMAR FASCIAL FIBROMATOSIS (DUPUYTREN): ICD-10-CM

## 2023-05-23 DIAGNOSIS — Z00.00 ENCOUNTER FOR PREVENTIVE HEALTH EXAMINATION: Primary | ICD-10-CM

## 2023-05-23 DIAGNOSIS — M85.80 OSTEOPENIA, UNSPECIFIED LOCATION: ICD-10-CM

## 2023-05-23 DIAGNOSIS — E78.5 HYPERLIPIDEMIA, UNSPECIFIED HYPERLIPIDEMIA TYPE: ICD-10-CM

## 2023-05-23 DIAGNOSIS — F41.1 GAD (GENERALIZED ANXIETY DISORDER): ICD-10-CM

## 2023-05-23 DIAGNOSIS — H93.19 TINNITUS, UNSPECIFIED LATERALITY: ICD-10-CM

## 2023-05-23 PROCEDURE — 1159F PR MEDICATION LIST DOCUMENTED IN MEDICAL RECORD: ICD-10-PCS | Mod: CPTII,S$GLB,, | Performed by: NURSE PRACTITIONER

## 2023-05-23 PROCEDURE — G0402 INITIAL PREVENTIVE EXAM: HCPCS | Mod: S$GLB,,, | Performed by: NURSE PRACTITIONER

## 2023-05-23 PROCEDURE — 3078F PR MOST RECENT DIASTOLIC BLOOD PRESSURE < 80 MM HG: ICD-10-PCS | Mod: CPTII,S$GLB,, | Performed by: NURSE PRACTITIONER

## 2023-05-23 PROCEDURE — 1101F PT FALLS ASSESS-DOCD LE1/YR: CPT | Mod: CPTII,S$GLB,, | Performed by: NURSE PRACTITIONER

## 2023-05-23 PROCEDURE — 1160F PR REVIEW ALL MEDS BY PRESCRIBER/CLIN PHARMACIST DOCUMENTED: ICD-10-PCS | Mod: CPTII,S$GLB,, | Performed by: NURSE PRACTITIONER

## 2023-05-23 PROCEDURE — G0402 PR WELCOME MEDICARE PREVENTIVE VISIT NEW ENROLLEE: ICD-10-PCS | Mod: S$GLB,,, | Performed by: NURSE PRACTITIONER

## 2023-05-23 PROCEDURE — 1159F MED LIST DOCD IN RCRD: CPT | Mod: CPTII,S$GLB,, | Performed by: NURSE PRACTITIONER

## 2023-05-23 PROCEDURE — 3288F FALL RISK ASSESSMENT DOCD: CPT | Mod: CPTII,S$GLB,, | Performed by: NURSE PRACTITIONER

## 2023-05-23 PROCEDURE — 3288F PR FALLS RISK ASSESSMENT DOCUMENTED: ICD-10-PCS | Mod: CPTII,S$GLB,, | Performed by: NURSE PRACTITIONER

## 2023-05-23 PROCEDURE — 3078F DIAST BP <80 MM HG: CPT | Mod: CPTII,S$GLB,, | Performed by: NURSE PRACTITIONER

## 2023-05-23 PROCEDURE — 1160F RVW MEDS BY RX/DR IN RCRD: CPT | Mod: CPTII,S$GLB,, | Performed by: NURSE PRACTITIONER

## 2023-05-23 PROCEDURE — 3008F BODY MASS INDEX DOCD: CPT | Mod: CPTII,S$GLB,, | Performed by: NURSE PRACTITIONER

## 2023-05-23 PROCEDURE — 1101F PR PT FALLS ASSESS DOC 0-1 FALLS W/OUT INJ PAST YR: ICD-10-PCS | Mod: CPTII,S$GLB,, | Performed by: NURSE PRACTITIONER

## 2023-05-23 PROCEDURE — 3074F PR MOST RECENT SYSTOLIC BLOOD PRESSURE < 130 MM HG: ICD-10-PCS | Mod: CPTII,S$GLB,, | Performed by: NURSE PRACTITIONER

## 2023-05-23 PROCEDURE — 3074F SYST BP LT 130 MM HG: CPT | Mod: CPTII,S$GLB,, | Performed by: NURSE PRACTITIONER

## 2023-05-23 PROCEDURE — 3008F PR BODY MASS INDEX (BMI) DOCUMENTED: ICD-10-PCS | Mod: CPTII,S$GLB,, | Performed by: NURSE PRACTITIONER

## 2023-05-23 NOTE — Clinical Note
Medicare awv complete. Health maintenance:  Patient declined tetanus vaccine, shingles vaccine, pneumonia vaccine at this time.

## 2023-05-23 NOTE — PROGRESS NOTES
"Latasha Montes presented for Medicare AWV today. The following components were reviewed and updated:    Medical history  Family History  Social history  Allergies and Current Medications  Health Risk Assessment  Health Maintenance  Care Team    **See Completed Assessments for Annual Wellness visit with in the encounter summary    The following assessments were completed:  Depression Screening  Cognitive function Screening      Timed Get Up Test  Whisper Test    Vitals:    05/23/23 0959 05/23/23 1018   BP: 105/62 100/66   BP Location: Right arm Right arm   Patient Position: Sitting Standing   Pulse: 81 94   Temp: 97.9 °F (36.6 °C)    TempSrc: Temporal    SpO2: 98%    Weight: 68 kg (150 lb)    Height: 5' 9" (1.753 m)      Body mass index is 22.15 kg/m².   ]    Physical Exam  Vitals reviewed.   Constitutional:       Appearance: Normal appearance.   HENT:      Head: Normocephalic and atraumatic.   Cardiovascular:      Rate and Rhythm: Normal rate and regular rhythm.      Pulses: Normal pulses.      Heart sounds: Normal heart sounds.   Pulmonary:      Effort: Pulmonary effort is normal.      Breath sounds: Normal breath sounds.   Musculoskeletal:         General: Normal range of motion.      Cervical back: Normal range of motion and neck supple.      Comments: Contracted 4th digit to right hand   Skin:     General: Skin is warm and dry.   Neurological:      Mental Status: She is alert and oriented to person, place, and time.   Psychiatric:         Mood and Affect: Mood normal.         Behavior: Behavior normal.        Outpatient Medications Marked as Taking for the 5/23/23 encounter (Office Visit) with ALLYSSA Fish   Medication Sig Dispense Refill    ascorbic acid, vitamin C, (VITAMIN C) 1000 MG tablet Take 500 mg by mouth once daily.      calcium cit/Mgox/vit D3/B6/min (CALCIUM-MAG-VIT B6-D3-MINERALS ORAL) Take 1 tablet by mouth once daily.      cartilage/collagen/bor/hyalur (JOINT HEALTH ORAL) Take 1 tablet " by mouth Daily.      cholecalciferol, vitamin D3, 125 mcg (5,000 unit) Tab Take 5,000 Units by mouth once daily.      cinnamon bark (CINNAMON ORAL) Take 1 tablet by mouth Daily.      COLLAGEN-BIOTIN-ASCORBIC ACID ORAL Take 1 tablet by mouth every other day.      cyanocobalamin, vitamin B-12, (VITAMIN B-12) 2,500 mcg Subl Place 2,500 mcg under the tongue once daily.      GREEN TEA EXTRACT ORAL Take 1 tablet by mouth once daily.      loratadine 10 mg Cap Take 1 tablet by mouth once daily.      sertraline (ZOLOFT) 50 MG tablet Take 1 tablet (50 mg total) by mouth every evening. 90 tablet 1    TURMERIC ORAL Take 1 tablet by mouth once daily.      zinc gluconate 50 mg tablet Take 50 mg by mouth once daily.          Diagnoses and health risks identified today and associated recommendations/orders:  1. Encounter for preventive health examination  Medicare awv complete. Health maintenance:  Patient declined tetanus vaccine, shingles vaccine, pneumonia vaccine at this time.    2. Hyperlipidemia, unspecified hyperlipidemia type  Lab Results   Component Value Date    CHOL 239 (H) 04/03/2023    CHOL 235 (H) 04/01/2022     Lab Results   Component Value Date    HDL 79 (H) 04/03/2023    HDL 83 (H) 04/01/2022     Lab Results   Component Value Date    LDLCALC 145.0 04/03/2023    LDLCALC 140.6 04/01/2022     No results found for: DLDL  Lab Results   Component Value Date    TRIG 75 04/03/2023    TRIG 57 04/01/2022       f1   Lab Results   Component Value Date    CHOLHDL 33.1 04/03/2023    CHOLHDL 35.3 04/01/2022    Chronic. Recommend heart healthy diet and continue regular exercise most days a week for least 30-45 minutes per day.  Use olive oil. No butter.  No fried foods.  Follow-up with PCP.    3. Palpitations  Stable. Continue current management.  Follow up With PCP.    4. Tinnitus, unspecified laterality  Chronic and stable. Continue current management. Follow up with PCP.     5. Chronic laryngitis  Chronic and stable. Continue  current management. See med list above. Follow up with PCP.     6. EARL (generalized anxiety disorder)  Chronic and stable. Continue current management. On zoloft. Has new dog who helps her with anxiety. See med list above. Follow up with PCP.     7. Osteopenia, unspecified location  stable on current management. Continue vitamin d, calcium in the diet, and weight bearing exercise. See med list above. Follow up with PCP.       8. Palmar fascial fibromatosis (dupuytren)  Chronic. F/u with surgeon/pcp.         Provided Latasha with a 5-10 year written screening schedule and personal prevention plan. Recommendations were developed using the USPSTF age appropriate recommendations. Education, counseling, and referrals were provided as needed.  After Visit Summary printed and given to patient which includes a list of additional screenings\tests needed.    Follow up in about 1 year (around 5/23/2024) for annual wellness visit.      Carolyn Pollard, ALLYSSA    I offered to discuss advanced care planning, including how to pick a person who would make decisions for you if you were unable to make them for yourself, called a health care power of , and what kind of decisions you might make such as use of life sustaining treatments such as ventilators and tube feeding when faced with a life limiting illness recorded on a living will that they will need to know. (How you want to be cared for as you near the end of your natural life)     X  Patient has advanced directives written and agrees to provide copies to the institution.

## 2023-05-23 NOTE — PATIENT INSTRUCTIONS
Counseling and Referral of Other Preventative  (Italic type indicates deductible and co-insurance are waived)    Patient Name: Latasha Montes  Today's Date: 5/23/2023    Health Maintenance       Date Due Completion Date    COVID-19 Vaccine (1) 10/13/2023 (Originally 1960) ---    TETANUS VACCINE 05/23/2024 (Originally 2/23/1978) ---    Shingles Vaccine (1 of 2) 05/23/2024 (Originally 2/23/2008) ---    Pneumococcal Vaccines (Age 65+) (1 - PCV) 05/23/2024 (Originally 2/23/2023) ---    Influenza Vaccine (Season Ended) 09/01/2023 ---    Mammogram 05/01/2024 5/1/2023    Colorectal Cancer Screening 04/12/2025 4/12/2022    DEXA Scan 04/25/2025 4/25/2022    Lipid Panel 04/03/2028 4/3/2023        No orders of the defined types were placed in this encounter.    The following information is provided to all patients.  This information is to help you find resources for any of the problems found today that may be affecting your health:                Living healthy guide: www.[facility].[place].gov      Understanding Diabetes: www.diabetes.org      Eating healthy: www.cdc.gov/healthyweight      CDC home safety checklist: www.cdc.gov/steadi/patient.html      Agency on Aging: www.goea.[place].[facility]      Alcoholics anonymous (AA): www.aa.org      Physical Activity: www.alison.nih.gov/ak2btkc      Tobacco use: www.quitwithusla.org

## 2023-05-25 ENCOUNTER — PATIENT MESSAGE (OUTPATIENT)
Dept: INTERNAL MEDICINE | Facility: CLINIC | Age: 63
End: 2023-05-25
Payer: MEDICARE

## 2023-05-31 ENCOUNTER — OFFICE VISIT (OUTPATIENT)
Dept: INTERNAL MEDICINE | Facility: CLINIC | Age: 63
End: 2023-05-31
Payer: MEDICARE

## 2023-05-31 ENCOUNTER — TELEPHONE (OUTPATIENT)
Dept: INTERNAL MEDICINE | Facility: CLINIC | Age: 63
End: 2023-05-31
Payer: MEDICARE

## 2023-05-31 VITALS
HEART RATE: 86 BPM | WEIGHT: 153 LBS | BODY MASS INDEX: 22.66 KG/M2 | HEIGHT: 69 IN | DIASTOLIC BLOOD PRESSURE: 84 MMHG | TEMPERATURE: 98 F | SYSTOLIC BLOOD PRESSURE: 130 MMHG | OXYGEN SATURATION: 97 %

## 2023-05-31 DIAGNOSIS — B02.9 HERPES ZOSTER WITHOUT COMPLICATION: Primary | ICD-10-CM

## 2023-05-31 DIAGNOSIS — F41.1 GAD (GENERALIZED ANXIETY DISORDER): ICD-10-CM

## 2023-05-31 PROCEDURE — 1160F RVW MEDS BY RX/DR IN RCRD: CPT | Mod: CPTII,S$GLB,, | Performed by: EMERGENCY MEDICINE

## 2023-05-31 PROCEDURE — 3079F PR MOST RECENT DIASTOLIC BLOOD PRESSURE 80-89 MM HG: ICD-10-PCS | Mod: CPTII,S$GLB,, | Performed by: EMERGENCY MEDICINE

## 2023-05-31 PROCEDURE — 99213 PR OFFICE/OUTPT VISIT, EST, LEVL III, 20-29 MIN: ICD-10-PCS | Mod: S$GLB,,, | Performed by: EMERGENCY MEDICINE

## 2023-05-31 PROCEDURE — 3008F BODY MASS INDEX DOCD: CPT | Mod: CPTII,S$GLB,, | Performed by: EMERGENCY MEDICINE

## 2023-05-31 PROCEDURE — 99999 PR PBB SHADOW E&M-EST. PATIENT-LVL IV: CPT | Mod: PBBFAC,,, | Performed by: EMERGENCY MEDICINE

## 2023-05-31 PROCEDURE — 99999 PR PBB SHADOW E&M-EST. PATIENT-LVL IV: ICD-10-PCS | Mod: PBBFAC,,, | Performed by: EMERGENCY MEDICINE

## 2023-05-31 PROCEDURE — 3288F FALL RISK ASSESSMENT DOCD: CPT | Mod: CPTII,S$GLB,, | Performed by: EMERGENCY MEDICINE

## 2023-05-31 PROCEDURE — 3079F DIAST BP 80-89 MM HG: CPT | Mod: CPTII,S$GLB,, | Performed by: EMERGENCY MEDICINE

## 2023-05-31 PROCEDURE — 3288F PR FALLS RISK ASSESSMENT DOCUMENTED: ICD-10-PCS | Mod: CPTII,S$GLB,, | Performed by: EMERGENCY MEDICINE

## 2023-05-31 PROCEDURE — 1101F PT FALLS ASSESS-DOCD LE1/YR: CPT | Mod: CPTII,S$GLB,, | Performed by: EMERGENCY MEDICINE

## 2023-05-31 PROCEDURE — 3075F SYST BP GE 130 - 139MM HG: CPT | Mod: CPTII,S$GLB,, | Performed by: EMERGENCY MEDICINE

## 2023-05-31 PROCEDURE — 1159F PR MEDICATION LIST DOCUMENTED IN MEDICAL RECORD: ICD-10-PCS | Mod: CPTII,S$GLB,, | Performed by: EMERGENCY MEDICINE

## 2023-05-31 PROCEDURE — 1101F PR PT FALLS ASSESS DOC 0-1 FALLS W/OUT INJ PAST YR: ICD-10-PCS | Mod: CPTII,S$GLB,, | Performed by: EMERGENCY MEDICINE

## 2023-05-31 PROCEDURE — 3008F PR BODY MASS INDEX (BMI) DOCUMENTED: ICD-10-PCS | Mod: CPTII,S$GLB,, | Performed by: EMERGENCY MEDICINE

## 2023-05-31 PROCEDURE — 3075F PR MOST RECENT SYSTOLIC BLOOD PRESS GE 130-139MM HG: ICD-10-PCS | Mod: CPTII,S$GLB,, | Performed by: EMERGENCY MEDICINE

## 2023-05-31 PROCEDURE — 99213 OFFICE O/P EST LOW 20 MIN: CPT | Mod: S$GLB,,, | Performed by: EMERGENCY MEDICINE

## 2023-05-31 PROCEDURE — 1160F PR REVIEW ALL MEDS BY PRESCRIBER/CLIN PHARMACIST DOCUMENTED: ICD-10-PCS | Mod: CPTII,S$GLB,, | Performed by: EMERGENCY MEDICINE

## 2023-05-31 PROCEDURE — 1159F MED LIST DOCD IN RCRD: CPT | Mod: CPTII,S$GLB,, | Performed by: EMERGENCY MEDICINE

## 2023-05-31 RX ORDER — VALACYCLOVIR HYDROCHLORIDE 1 G/1
1000 TABLET, FILM COATED ORAL 2 TIMES DAILY
Qty: 60 TABLET | Refills: 1 | Status: SHIPPED | OUTPATIENT
Start: 2023-05-31 | End: 2024-05-30

## 2023-05-31 NOTE — TELEPHONE ENCOUNTER
.Left message on patient voicemail informing that she will need an appt scx as this medication is not listed in her medication chart and will not be able to be prescribed without reason./minor

## 2023-05-31 NOTE — TELEPHONE ENCOUNTER
----- Message from Cecelia Tate sent at 5/31/2023  7:26 AM CDT -----  Contact: Latasha  Type:  RX Refill Request    Who Called:  Latasha  Refill or New Rx: New refill   RX Name and Strength: Valaciclovir  How is the patient currently taking it? (ex. 1XDay):   Is this a 30 day or 90 day RX:   Preferred Pharmacy with phone number:   [id number] - [place], [place] - [address] [address] [address]  Phone: [phone] Fax: [phone]  Local or Mail Order: Local   Ordering Provider:   Would the patient rather a call back or a response via [facility]? Call back   Best Call Back Number: Please call her at [phone]  Additional Information:

## 2023-05-31 NOTE — PROGRESS NOTES
Subjective:       Patient ID: Latasha Montes is a 63 y.o. female.    Chief Complaint: Rash    Rash  Pertinent negatives include no cough or shortness of breath.     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 Luong) and she is out of her Valtrex, hx of Encephalitis. No fever chills, but feels the blister is hot.       Past Medical History:   Diagnosis Date    Encephalitis      Past Surgical History:   Procedure Laterality Date    AUGMENTATION OF BREAST  2005    HAND SURGERY Right 2022    for dupuytren contracture; Dr. Everett Garcia    REPAIR OF LYMPHOCELE  2021    TONGUE BIOPSY  2021     Past Surgical History:   Procedure Laterality Date    AUGMENTATION OF BREAST  2005    HAND SURGERY Right 2022    for dupuytren contracture; Dr. Everett Garcia    REPAIR OF LYMPHOCELE  2021    TONGUE BIOPSY  2021     Social History     Socioeconomic History    Marital status:    Tobacco Use    Smoking status: Former     Packs/day: 0.10     Years: 1.00     Pack years: 0.10     Types: Cigarettes     Quit date:      Years since quittin.4    Smokeless tobacco: Former    Tobacco comments:     1 pack per week for 6 months, quit in    Substance and Sexual Activity    Alcohol use: No    Drug use: Never    Sexual activity: Not Currently     Social Determinants of Health     Financial Resource Strain: Low Risk     Difficulty of Paying Living Expenses: Not hard at all   Food Insecurity: No Food Insecurity    Worried About Running Out of Food in the Last Year: Never true    Ran Out of Food in the Last Year: Never true   Transportation Needs: No Transportation Needs    Lack of Transportation (Medical): No    Lack of Transportation (Non-Medical): No   Physical Activity: Sufficiently Active    Days of Exercise per Week: 5 days     Minutes of Exercise per Session: 40 min   Stress: No Stress Concern Present    Feeling of Stress : Not at all   Social Connections: Unknown    Frequency of Communication with Friends and Family: Twice a week    Attends Restoration Services: More than 4 times per year    Active Member of Clubs or Organizations: No    Attends Club or Organization Meetings: Never    Marital Status:    Housing Stability: Low Risk     Unable to Pay for Housing in the Last Year: No    Number of Places Lived in the Last Year: 1    Unstable Housing in the Last Year: No     Review of patient's allergies indicates:  No Known Allergies  Current Outpatient Medications   Medication Sig    ascorbic acid, vitamin C, (VITAMIN C) 1000 MG tablet Take 500 mg by mouth once daily.    calcium cit/Mgox/vit D3/B6/min (CALCIUM-MAG-VIT B6-D3-MINERALS ORAL) Take 1 tablet by mouth once daily.    cartilage/collagen/bor/hyalur (JOINT HEALTH ORAL) Take 1 tablet by mouth Daily.    cholecalciferol, vitamin D3, 125 mcg (5,000 unit) Tab Take 5,000 Units by mouth once daily.    cinnamon bark (CINNAMON ORAL) Take 1 tablet by mouth Daily.    COLLAGEN-BIOTIN-ASCORBIC ACID ORAL Take 1 tablet by mouth every other day.    cyanocobalamin, vitamin B-12, (VITAMIN B-12) 2,500 mcg Subl Place 2,500 mcg under the tongue once daily.    GREEN TEA EXTRACT ORAL Take 1 tablet by mouth once daily.    loratadine 10 mg Cap Take 1 tablet by mouth once daily.    mupirocin (BACTROBAN) 2 % ointment Apply topically as needed.    sertraline (ZOLOFT) 50 MG tablet Take 1 tablet (50 mg total) by mouth every evening.    TURMERIC ORAL Take 1 tablet by mouth once daily.    valACYclovir (VALTREX) 1000 MG tablet Take 1 tablet (1,000 mg total) by mouth 2 (two) times daily.    zinc gluconate 50 mg tablet Take 50 mg by mouth once daily.     No current facility-administered medications for this visit.           Review of Systems   Constitutional: Negative.    HENT: Negative.      Eyes: Negative.  Negative for visual disturbance.   Respiratory: Negative.  Negative for cough, shortness of breath and wheezing.    Cardiovascular: Negative.    Gastrointestinal: Negative.    Endocrine: Negative.    Genitourinary: Negative.    Musculoskeletal:  Negative for arthralgias and gait problem.   Skin:  Positive for rash.   Allergic/Immunologic: Negative.    Neurological: Negative.    Hematological: Negative.    Psychiatric/Behavioral: Negative.       Objective:      Physical Exam  Vitals and nursing note reviewed.   Constitutional:       Appearance: Normal appearance. She is obese. She is not ill-appearing or toxic-appearing.   HENT:      Head: Normocephalic and atraumatic.      Right Ear: External ear normal.      Left Ear: External ear normal.      Nose: Nose normal.      Mouth/Throat:      Mouth: Mucous membranes are moist.      Pharynx: Oropharynx is clear.   Eyes:      General: No scleral icterus.     Extraocular Movements: Extraocular movements intact.      Conjunctiva/sclera: Conjunctivae normal.      Pupils: Pupils are equal, round, and reactive to light.   Cardiovascular:      Rate and Rhythm: Normal rate and regular rhythm.      Pulses: Normal pulses.      Heart sounds: Normal heart sounds. No murmur heard.    No friction rub.   Pulmonary:      Effort: Pulmonary effort is normal. No respiratory distress.      Breath sounds: Normal breath sounds. No wheezing.   Abdominal:      General: Abdomen is flat. Bowel sounds are normal. There is no distension.      Palpations: Abdomen is soft.   Musculoskeletal:         General: No swelling, tenderness, deformity or signs of injury. Normal range of motion.      Cervical back: Normal range of motion and neck supple.   Skin:     General: Skin is warm and dry.      Capillary Refill: Capillary refill takes less than 2 seconds.      Coloration: Skin is not pale.      Findings: Lesion and rash present. No erythema.             Comments: Quarter size area of  erythema with 3 tiny vesicles- warm to touch.   Neurological:      General: No focal deficit present.      Mental Status: She is alert and oriented to person, place, and time.   Psychiatric:         Mood and Affect: Mood normal.         Behavior: Behavior normal.         Thought Content: Thought content normal.         Judgment: Judgment normal.       Assessment:     Vitals:    05/31/23 1347   BP: 130/84   Pulse: 86   Temp: 98.1 °F (36.7 °C)     1. Herpes zoster without complication  -     valACYclovir (VALTREX) 1000 MG tablet; Take 1 tablet (1,000 mg total) by mouth 2 (two) times daily.  Dispense: 60 tablet; Refill: 1    2. EARL (generalized anxiety disorder): stable- cont Zoloft 50          Provider pre-printed instructions given

## 2023-05-31 NOTE — TELEPHONE ENCOUNTER
----- Message from Cecelia Tate sent at 5/31/2023  7:26 AM CDT -----  Contact: Latasha  Type:  RX Refill Request    Who Called:  Latasha  Refill or New Rx: New refill   RX Name and Strength: Valaciclovir  How is the patient currently taking it? (ex. 1XDay):   Is this a 30 day or 90 day RX:   Preferred Pharmacy with phone number:   [id number] - [place], [place] - [address] [address] [address]  Phone: [phone] Fax: [phone]  Local or Mail Order: Local   Ordering Provider:   Would the patient rather a call back or a response via [facility]? Call back   Best Call Back Number: Please call her at [phone]  Additional Information:

## 2023-05-31 NOTE — TELEPHONE ENCOUNTER
S/w pt and was able to scx an appt on today with Dr. Weller for Shingles. Pt voiced understanding./Clintonw

## 2023-05-31 NOTE — TELEPHONE ENCOUNTER
----- Message from Suzanne Alaniz sent at 5/31/2023 11:42 AM CDT -----  Regarding: Same Day Appt Access  Contact: [phone]  Pt is requesting to be seen today for Shingles outbreak.  Pt prev requested medication but was told it's no longer in her medication list and she needs an appt.  Pt wishes to be seen today to start the medication before it gets worse.  Please call.

## 2023-10-09 ENCOUNTER — LAB VISIT (OUTPATIENT)
Dept: LAB | Facility: HOSPITAL | Age: 63
End: 2023-10-09
Attending: INTERNAL MEDICINE
Payer: MEDICARE

## 2023-10-09 ENCOUNTER — OFFICE VISIT (OUTPATIENT)
Dept: INTERNAL MEDICINE | Facility: CLINIC | Age: 63
End: 2023-10-09
Payer: MEDICARE

## 2023-10-09 VITALS
OXYGEN SATURATION: 97 % | HEIGHT: 69 IN | HEART RATE: 73 BPM | DIASTOLIC BLOOD PRESSURE: 68 MMHG | BODY MASS INDEX: 22.36 KG/M2 | WEIGHT: 151 LBS | SYSTOLIC BLOOD PRESSURE: 120 MMHG | RESPIRATION RATE: 20 BRPM

## 2023-10-09 DIAGNOSIS — M85.80 OSTEOPENIA, UNSPECIFIED LOCATION: ICD-10-CM

## 2023-10-09 DIAGNOSIS — F41.1 GAD (GENERALIZED ANXIETY DISORDER): Primary | ICD-10-CM

## 2023-10-09 DIAGNOSIS — F41.1 GAD (GENERALIZED ANXIETY DISORDER): ICD-10-CM

## 2023-10-09 DIAGNOSIS — E78.2 MIXED HYPERLIPIDEMIA: ICD-10-CM

## 2023-10-09 DIAGNOSIS — J37.0 CHRONIC LARYNGITIS: ICD-10-CM

## 2023-10-09 DIAGNOSIS — Z12.31 SCREENING MAMMOGRAM FOR HIGH-RISK PATIENT: ICD-10-CM

## 2023-10-09 PROBLEM — R00.2 PALPITATIONS: Status: RESOLVED | Noted: 2022-10-13 | Resolved: 2023-10-09

## 2023-10-09 LAB
ALBUMIN SERPL BCP-MCNC: 3.9 G/DL (ref 3.5–5.2)
ALP SERPL-CCNC: 56 U/L (ref 55–135)
ALT SERPL W/O P-5'-P-CCNC: 18 U/L (ref 10–44)
ANION GAP SERPL CALC-SCNC: 8 MMOL/L (ref 8–16)
AST SERPL-CCNC: 22 U/L (ref 10–40)
BILIRUB SERPL-MCNC: 0.6 MG/DL (ref 0.1–1)
BUN SERPL-MCNC: 13 MG/DL (ref 8–23)
CALCIUM SERPL-MCNC: 9.5 MG/DL (ref 8.7–10.5)
CHLORIDE SERPL-SCNC: 103 MMOL/L (ref 95–110)
CHOLEST SERPL-MCNC: 265 MG/DL (ref 120–199)
CHOLEST/HDLC SERPL: 3.8 {RATIO} (ref 2–5)
CO2 SERPL-SCNC: 28 MMOL/L (ref 23–29)
CREAT SERPL-MCNC: 0.8 MG/DL (ref 0.5–1.4)
EST. GFR  (NO RACE VARIABLE): >60 ML/MIN/1.73 M^2
GLUCOSE SERPL-MCNC: 89 MG/DL (ref 70–110)
HDLC SERPL-MCNC: 69 MG/DL (ref 40–75)
HDLC SERPL: 26 % (ref 20–50)
LDLC SERPL CALC-MCNC: 174.2 MG/DL (ref 63–159)
NONHDLC SERPL-MCNC: 196 MG/DL
POTASSIUM SERPL-SCNC: 4.3 MMOL/L (ref 3.5–5.1)
PROT SERPL-MCNC: 7.1 G/DL (ref 6–8.4)
SODIUM SERPL-SCNC: 139 MMOL/L (ref 136–145)
TRIGL SERPL-MCNC: 109 MG/DL (ref 30–150)

## 2023-10-09 PROCEDURE — 99999 PR PBB SHADOW E&M-EST. PATIENT-LVL III: ICD-10-PCS | Mod: PBBFAC,,, | Performed by: FAMILY MEDICINE

## 2023-10-09 PROCEDURE — 1160F PR REVIEW ALL MEDS BY PRESCRIBER/CLIN PHARMACIST DOCUMENTED: ICD-10-PCS | Mod: CPTII,S$GLB,, | Performed by: FAMILY MEDICINE

## 2023-10-09 PROCEDURE — 1101F PT FALLS ASSESS-DOCD LE1/YR: CPT | Mod: CPTII,S$GLB,, | Performed by: FAMILY MEDICINE

## 2023-10-09 PROCEDURE — 3078F DIAST BP <80 MM HG: CPT | Mod: CPTII,S$GLB,, | Performed by: FAMILY MEDICINE

## 2023-10-09 PROCEDURE — 3078F PR MOST RECENT DIASTOLIC BLOOD PRESSURE < 80 MM HG: ICD-10-PCS | Mod: CPTII,S$GLB,, | Performed by: FAMILY MEDICINE

## 2023-10-09 PROCEDURE — 3074F SYST BP LT 130 MM HG: CPT | Mod: CPTII,S$GLB,, | Performed by: FAMILY MEDICINE

## 2023-10-09 PROCEDURE — 99999 PR PBB SHADOW E&M-EST. PATIENT-LVL III: CPT | Mod: PBBFAC,,, | Performed by: FAMILY MEDICINE

## 2023-10-09 PROCEDURE — 3288F FALL RISK ASSESSMENT DOCD: CPT | Mod: CPTII,S$GLB,, | Performed by: FAMILY MEDICINE

## 2023-10-09 PROCEDURE — 99214 OFFICE O/P EST MOD 30 MIN: CPT | Mod: S$GLB,,, | Performed by: FAMILY MEDICINE

## 2023-10-09 PROCEDURE — 3008F BODY MASS INDEX DOCD: CPT | Mod: CPTII,S$GLB,, | Performed by: FAMILY MEDICINE

## 2023-10-09 PROCEDURE — 3288F PR FALLS RISK ASSESSMENT DOCUMENTED: ICD-10-PCS | Mod: CPTII,S$GLB,, | Performed by: FAMILY MEDICINE

## 2023-10-09 PROCEDURE — 80053 COMPREHEN METABOLIC PANEL: CPT | Performed by: FAMILY MEDICINE

## 2023-10-09 PROCEDURE — 36415 COLL VENOUS BLD VENIPUNCTURE: CPT | Performed by: FAMILY MEDICINE

## 2023-10-09 PROCEDURE — 3074F PR MOST RECENT SYSTOLIC BLOOD PRESSURE < 130 MM HG: ICD-10-PCS | Mod: CPTII,S$GLB,, | Performed by: FAMILY MEDICINE

## 2023-10-09 PROCEDURE — 80061 LIPID PANEL: CPT | Performed by: FAMILY MEDICINE

## 2023-10-09 PROCEDURE — 1101F PR PT FALLS ASSESS DOC 0-1 FALLS W/OUT INJ PAST YR: ICD-10-PCS | Mod: CPTII,S$GLB,, | Performed by: FAMILY MEDICINE

## 2023-10-09 PROCEDURE — 1159F PR MEDICATION LIST DOCUMENTED IN MEDICAL RECORD: ICD-10-PCS | Mod: CPTII,S$GLB,, | Performed by: FAMILY MEDICINE

## 2023-10-09 PROCEDURE — 3008F PR BODY MASS INDEX (BMI) DOCUMENTED: ICD-10-PCS | Mod: CPTII,S$GLB,, | Performed by: FAMILY MEDICINE

## 2023-10-09 PROCEDURE — 99214 PR OFFICE/OUTPT VISIT, EST, LEVL IV, 30-39 MIN: ICD-10-PCS | Mod: S$GLB,,, | Performed by: FAMILY MEDICINE

## 2023-10-09 PROCEDURE — 1159F MED LIST DOCD IN RCRD: CPT | Mod: CPTII,S$GLB,, | Performed by: FAMILY MEDICINE

## 2023-10-09 PROCEDURE — 1160F RVW MEDS BY RX/DR IN RCRD: CPT | Mod: CPTII,S$GLB,, | Performed by: FAMILY MEDICINE

## 2023-10-09 RX ORDER — SERTRALINE HYDROCHLORIDE 50 MG/1
50 TABLET, FILM COATED ORAL NIGHTLY
Qty: 90 TABLET | Refills: 1 | Status: SHIPPED | OUTPATIENT
Start: 2023-10-09

## 2023-10-09 NOTE — PROGRESS NOTES
"Subjective:       Patient ID: Latasha Montes is a 63 y.o. female.    Chief Complaint: Follow-up    63-year-old female patient with Patient Active Problem List:     EARL (generalized anxiety disorder)     Tinnitus     Dysphonia     Palmar fascial fibromatosis (dupuytren)     Chronic laryngitis     Osteopenia     Hyperlipidemia  Here for follow-up on chronic medical conditions and reports that patient has been doing well with Zoloft 50 mg with anxiety but continues to have jaw clenching.   Denies any underlying depression, staying physically active and reports occasional joint pains like bilateral knee pains and hip pains  Denies any tingling or numbness sensation to extremities      Review of Systems   Constitutional:  Negative for fatigue.   Eyes:  Negative for visual disturbance.   Respiratory:  Negative for shortness of breath.    Cardiovascular:  Negative for chest pain and leg swelling.   Gastrointestinal:  Negative for abdominal pain, nausea and vomiting.   Musculoskeletal:  Positive for arthralgias. Negative for myalgias.   Skin:  Negative for rash.   Neurological:  Negative for light-headedness and headaches.   Psychiatric/Behavioral:  Negative for dysphoric mood, sleep disturbance and suicidal ideas. The patient is nervous/anxious.          /68 (BP Location: Right arm, Patient Position: Sitting, BP Method: Medium (Manual))   Pulse 73   Resp 20   Ht 5' 9" (1.753 m)   Wt 68.5 kg (151 lb 0.2 oz)   SpO2 97%   BMI 22.30 kg/m²   Objective:      Physical Exam  Constitutional:       Appearance: She is well-developed.   HENT:      Head: Normocephalic and atraumatic.   Cardiovascular:      Rate and Rhythm: Normal rate and regular rhythm.      Heart sounds: Normal heart sounds. No murmur heard.  Pulmonary:      Effort: Pulmonary effort is normal.      Breath sounds: Normal breath sounds. No wheezing.   Abdominal:      General: Bowel sounds are normal.      Palpations: Abdomen is soft.      Tenderness: " There is no abdominal tenderness.   Skin:     General: Skin is warm and dry.      Findings: No rash.   Neurological:      Mental Status: She is alert and oriented to person, place, and time.   Psychiatric:         Mood and Affect: Mood normal.           Assessment/Plan:   1. EARL (generalized anxiety disorder)  -     Comprehensive Metabolic Panel; Future; Expected date: 10/09/2023  -     sertraline (ZOLOFT) 50 MG tablet; Take 1 tablet (50 mg total) by mouth every evening.  Dispense: 90 tablet; Refill: 1  Stable on Zoloft 50 mg daily  Refill given today  If would like to lower the dose advised to take half tablet daily and see if there is any improvement    2. Mixed hyperlipidemia  -     Lipid Panel; Future; Expected date: 10/09/2023  Diet controlled    3. Osteopenia, unspecified location  Continue calcium with vitamin-D supplements and patient's discussed about considering to start Fosamax but patient not interested at this time    4. Chronic laryngitis  Stable    Refuses flu shot today    Patient would like to schedule her mammogram for next year, orders placed

## 2023-10-12 ENCOUNTER — TELEPHONE (OUTPATIENT)
Dept: INTERNAL MEDICINE | Facility: CLINIC | Age: 63
End: 2023-10-12
Payer: MEDICARE

## 2023-10-12 DIAGNOSIS — Z12.31 ENCOUNTER FOR SCREENING MAMMOGRAM FOR MALIGNANT NEOPLASM OF BREAST: Primary | ICD-10-CM

## 2023-10-12 NOTE — TELEPHONE ENCOUNTER
----- Message from Disha Wynn sent at 10/12/2023 12:47 PM CDT -----  Type:  Test Results    Who Called: pt  Name of Test (Lab/Mammo/Etc): lab  Date of Test: Tuesday   Ordering Provider: Dr Regalado   Where the test was performed: [facility]  Would the patient rather a call back or a response via [facility]? call  Best Call Back Number: [phone]  Additional Information:  .    Thank you

## 2023-12-16 ENCOUNTER — NURSE TRIAGE (OUTPATIENT)
Dept: ADMINISTRATIVE | Facility: CLINIC | Age: 63
End: 2023-12-16
Payer: MEDICARE

## 2023-12-16 NOTE — TELEPHONE ENCOUNTER
"    Reason for Disposition   SEVERE dizziness (e.g., unable to stand, requires support to walk, feels like passing out now)    Additional Information   Negative: SEVERE difficulty breathing (e.g., struggling for each breath, speaks in single words)   Negative: [1] Difficulty breathing or swallowing AND [2] started suddenly after medicine, an allergic food or bee sting   Negative: Shock suspected (e.g., cold/pale/clammy skin, too weak to stand, low BP, rapid pulse)   Negative: Difficult to awaken or acting confused (e.g., disoriented, slurred speech)   Negative: [1] Weakness (i.e., paralysis, loss of muscle strength) of the face, arm or leg on one side of the body AND [2] sudden onset AND [3] present now   Negative: [1] Numbness (i.e., loss of sensation) of the face, arm or leg on one side of the body AND [2] sudden onset AND [3] present now   Negative: [1] Loss of speech or garbled speech AND [2] sudden onset AND [3] present now   Negative: Overdose (accidental or intentional) of medications   Negative: [1] Fainted > 15 minutes ago AND [2] still feels too weak or dizzy to stand   Negative: Heart beating < 50 beats per minute OR > 140 beats per minute   Negative: Sounds like a life-threatening emergency to the triager   Negative: Chest pain   Negative: Rectal bleeding, bloody stool, or tarry-black stool   Negative: [1] Vomiting AND [2] contains red blood or black ("coffee ground") material   Negative: Vomiting is main symptom   Negative: Diarrhea is main symptom    Protocols used: Dizziness - [id number]    Latasha called to say she woke up with dizziness this morning.  Went back to bed to rest more.  Up now and still dizzy and her hands are shaking.  If drops her head down becomes dizzier.  Hands are shaking.  Vision changes, nausea. Things "seem to be moving". States difficulty walking due to unsteadiness, is home with . Feels like she may fall if she attempts to walk.  Does not monitor BP at home, no " diagnosis of HTN, per Latasha. Per [facility] triage protocol, recommend ED now for evaluation.  She states she would rather go to [facility].  Strongly encouraged ED, but she again states she wants to go to [facility].  Provided her locations of [facility] nearest to her home.  She states she might go to [facility] that is closer to her home,  to drive.  Again, encouraged ED.  She states she will decide whether ED or [facility].  Message to Oriana Regalado MD, pcp.  Please contact caller directly with any additional care advice.

## 2023-12-18 NOTE — TELEPHONE ENCOUNTER
Spoke with pt. States did not go to ER as instructed per on call provider. Per pt dizziness has past. Scheduled fu with GOKUL Dixon on 12/20/2023 at 1300.//ddw

## 2023-12-20 ENCOUNTER — HOSPITAL ENCOUNTER (OUTPATIENT)
Dept: CARDIOLOGY | Facility: HOSPITAL | Age: 63
Discharge: HOME | End: 2023-12-20
Payer: MEDICARE

## 2023-12-20 ENCOUNTER — OFFICE VISIT (OUTPATIENT)
Dept: INTERNAL MEDICINE | Facility: CLINIC | Age: 63
End: 2023-12-20
Payer: MEDICARE

## 2023-12-20 VITALS
HEART RATE: 87 BPM | WEIGHT: 150.81 LBS | TEMPERATURE: 98 F | OXYGEN SATURATION: 97 % | BODY MASS INDEX: 22.34 KG/M2 | DIASTOLIC BLOOD PRESSURE: 60 MMHG | HEIGHT: 69 IN | SYSTOLIC BLOOD PRESSURE: 130 MMHG

## 2023-12-20 DIAGNOSIS — R42 DIZZINESS AND GIDDINESS: Primary | ICD-10-CM

## 2023-12-20 DIAGNOSIS — F41.1 GAD (GENERALIZED ANXIETY DISORDER): ICD-10-CM

## 2023-12-20 DIAGNOSIS — R42 DIZZINESS AND GIDDINESS: ICD-10-CM

## 2023-12-20 PROCEDURE — 93010 ELECTROCARDIOGRAM REPORT: CPT | Mod: ,,, | Performed by: STUDENT IN AN ORGANIZED HEALTH CARE EDUCATION/TRAINING PROGRAM

## 2023-12-20 PROCEDURE — 3288F FALL RISK ASSESSMENT DOCD: CPT | Mod: CPTII,S$GLB,, | Performed by: PHYSICIAN ASSISTANT

## 2023-12-20 PROCEDURE — 99999 PR PBB SHADOW E&M-EST. PATIENT-LVL V: CPT | Mod: PBBFAC,,, | Performed by: PHYSICIAN ASSISTANT

## 2023-12-20 PROCEDURE — 99213 PR OFFICE/OUTPT VISIT, EST, LEVL III, 20-29 MIN: ICD-10-PCS | Mod: S$GLB,,, | Performed by: PHYSICIAN ASSISTANT

## 2023-12-20 PROCEDURE — 3008F PR BODY MASS INDEX (BMI) DOCUMENTED: ICD-10-PCS | Mod: CPTII,S$GLB,, | Performed by: PHYSICIAN ASSISTANT

## 2023-12-20 PROCEDURE — 1159F MED LIST DOCD IN RCRD: CPT | Mod: CPTII,S$GLB,, | Performed by: PHYSICIAN ASSISTANT

## 2023-12-20 PROCEDURE — 1101F PT FALLS ASSESS-DOCD LE1/YR: CPT | Mod: CPTII,S$GLB,, | Performed by: PHYSICIAN ASSISTANT

## 2023-12-20 PROCEDURE — 99999 PR PBB SHADOW E&M-EST. PATIENT-LVL V: ICD-10-PCS | Mod: PBBFAC,,, | Performed by: PHYSICIAN ASSISTANT

## 2023-12-20 PROCEDURE — 1159F PR MEDICATION LIST DOCUMENTED IN MEDICAL RECORD: ICD-10-PCS | Mod: CPTII,S$GLB,, | Performed by: PHYSICIAN ASSISTANT

## 2023-12-20 PROCEDURE — 93005 ELECTROCARDIOGRAM TRACING: CPT

## 2023-12-20 PROCEDURE — 3075F PR MOST RECENT SYSTOLIC BLOOD PRESS GE 130-139MM HG: ICD-10-PCS | Mod: CPTII,S$GLB,, | Performed by: PHYSICIAN ASSISTANT

## 2023-12-20 PROCEDURE — 99213 OFFICE O/P EST LOW 20 MIN: CPT | Mod: S$GLB,,, | Performed by: PHYSICIAN ASSISTANT

## 2023-12-20 PROCEDURE — 1160F RVW MEDS BY RX/DR IN RCRD: CPT | Mod: CPTII,S$GLB,, | Performed by: PHYSICIAN ASSISTANT

## 2023-12-20 PROCEDURE — 3008F BODY MASS INDEX DOCD: CPT | Mod: CPTII,S$GLB,, | Performed by: PHYSICIAN ASSISTANT

## 2023-12-20 PROCEDURE — 3078F DIAST BP <80 MM HG: CPT | Mod: CPTII,S$GLB,, | Performed by: PHYSICIAN ASSISTANT

## 2023-12-20 PROCEDURE — 1160F PR REVIEW ALL MEDS BY PRESCRIBER/CLIN PHARMACIST DOCUMENTED: ICD-10-PCS | Mod: CPTII,S$GLB,, | Performed by: PHYSICIAN ASSISTANT

## 2023-12-20 PROCEDURE — 3075F SYST BP GE 130 - 139MM HG: CPT | Mod: CPTII,S$GLB,, | Performed by: PHYSICIAN ASSISTANT

## 2023-12-20 PROCEDURE — 3288F PR FALLS RISK ASSESSMENT DOCUMENTED: ICD-10-PCS | Mod: CPTII,S$GLB,, | Performed by: PHYSICIAN ASSISTANT

## 2023-12-20 PROCEDURE — 93010 EKG 12-LEAD: ICD-10-PCS | Mod: ,,, | Performed by: STUDENT IN AN ORGANIZED HEALTH CARE EDUCATION/TRAINING PROGRAM

## 2023-12-20 PROCEDURE — 3078F PR MOST RECENT DIASTOLIC BLOOD PRESSURE < 80 MM HG: ICD-10-PCS | Mod: CPTII,S$GLB,, | Performed by: PHYSICIAN ASSISTANT

## 2023-12-20 PROCEDURE — 1101F PR PT FALLS ASSESS DOC 0-1 FALLS W/OUT INJ PAST YR: ICD-10-PCS | Mod: CPTII,S$GLB,, | Performed by: PHYSICIAN ASSISTANT

## 2023-12-20 NOTE — PROGRESS NOTES
Subjective:      Patient ID: Latasha Montes is a 63 y.o. female.    Chief Complaint: Follow-up (dizziness)    Dizziness:   Chronicity:  New  Onset: 4 days ago.  Frequency - weeks/days included: once for 2 hours.  Severity:  Disabling  Duration: 2-3 hours.  Dizziness characteristics:  Off-balance, sensation of movement and motion-sickness   Associated symptoms: nausea.no hearing loss, no ear congestion, no ear pain, no fever, no headaches, no tinnitus, no vomiting, no diaphoresis, no aural fullness, no weakness, no visual disturbances, no light-headedness, no syncope, no palpitations, no panic, no facial weakness, no slurred speech, no numbness in extremities and no chest pain.  Aggravated by:  Nothing  Treatments tried:  Rest  Improvements on treatment:  Resolvedno strokes, no cardiac surgery, no neurologic disease, no head trauma, no balance testing, no ear trauma, no ear surgery, no head trauma, no ear infections, no anxiety, no ear tubes, no environmental allergies, no MRI head and no CT head.    Episode of dizziness on Saturday morning. King like world was spinning. Had some loose stools right after. Then started getting nausea from the dizziness. Episode was constant for a couple of hours. Took home covid test and it was negative. No episodes since then.   Pt has chronic neck pain from DDD. No headache, changes in vision. No numbness/tingling.     Patient Active Problem List   Diagnosis    EARL (generalized anxiety disorder)    Tinnitus    Dysphonia    Palmar fascial fibromatosis (dupuytren)    Chronic laryngitis    Osteopenia    Hyperlipidemia         Current Outpatient Medications:     ascorbic acid, vitamin C, (VITAMIN C) 1000 MG tablet, Take 500 mg by mouth once daily., Disp: , Rfl:     calcium cit/Mgox/vit D3/B6/min (CALCIUM-MAG-VIT B6-D3-MINERALS ORAL), Take 1 tablet by mouth once daily., Disp: , Rfl:     cartilage/collagen/bor/hyalur (JOINT HEALTH ORAL), Take 1 tablet by mouth Daily., Disp: , Rfl:      cholecalciferol, vitamin D3, 125 mcg (5,000 unit) Tab, Take 5,000 Units by mouth once daily., Disp: , Rfl:     cinnamon bark (CINNAMON ORAL), Take 1 tablet by mouth Daily., Disp: , Rfl:     COLLAGEN-BIOTIN-ASCORBIC ACID ORAL, Take 1 tablet by mouth every other day., Disp: , Rfl:     cyanocobalamin, vitamin B-12, (VITAMIN B-12) 2,500 mcg Subl, Place 2,500 mcg under the tongue once daily., Disp: , Rfl:     GREEN TEA EXTRACT ORAL, Take 1 tablet by mouth once daily., Disp: , Rfl:     loratadine 10 mg Cap, Take 1 tablet by mouth once daily., Disp: , Rfl:     sertraline (ZOLOFT) 50 MG tablet, Take 1 tablet (50 mg total) by mouth every evening., Disp: 90 tablet, Rfl: 1    TURMERIC ORAL, Take 1 tablet by mouth once daily., Disp: , Rfl:     valACYclovir (VALTREX) 1000 MG tablet, Take 1 tablet (1,000 mg total) by mouth 2 (two) times daily., Disp: 60 tablet, Rfl: 1    zinc gluconate 50 mg tablet, Take 50 mg by mouth once daily., Disp: , Rfl:     Review of Systems   Constitutional:  Negative for activity change, appetite change, chills, diaphoresis, fatigue, fever and unexpected weight change.   HENT: Negative.  Negative for congestion, ear pain, hearing loss, postnasal drip, rhinorrhea, sore throat, tinnitus, trouble swallowing and voice change.    Eyes: Negative.  Negative for visual disturbance.   Respiratory: Negative.  Negative for cough, choking, chest tightness and shortness of breath.    Cardiovascular:  Negative for chest pain, palpitations, leg swelling and syncope.   Gastrointestinal:  Positive for nausea. Negative for abdominal distention, abdominal pain, blood in stool, constipation, diarrhea and vomiting.   Endocrine: Negative for cold intolerance, heat intolerance, polydipsia and polyuria.   Genitourinary: Negative.  Negative for difficulty urinating and frequency.   Musculoskeletal:  Negative for arthralgias, back pain, gait problem, joint swelling and myalgias.   Skin:  Negative for color change, pallor, rash  "and wound.   Allergic/Immunologic: Negative for environmental allergies.   Neurological:  Positive for dizziness. Negative for tremors, weakness, light-headedness, numbness and headaches.   Hematological:  Negative for adenopathy.   Psychiatric/Behavioral:  Negative for behavioral problems, confusion, self-injury, sleep disturbance and suicidal ideas. The patient is not nervous/anxious.      Objective:   /60 (BP Location: Left arm, Patient Position: Sitting, BP Method: Medium (Manual))   Pulse 87   Temp 97.8 °F (36.6 °C) (Tympanic)   Ht 5' 9" (1.753 m)   Wt 68.4 kg (150 lb 12.7 oz)   SpO2 97%   BMI 22.27 kg/m²     Physical Exam  Vitals and nursing note reviewed.   Constitutional:       General: She is not in acute distress.     Appearance: Normal appearance. She is well-developed. She is not ill-appearing, toxic-appearing or diaphoretic.   HENT:      Head: Normocephalic and atraumatic.      Mouth/Throat:      Mouth: Mucous membranes are moist.   Eyes:      Extraocular Movements: Extraocular movements intact.      Conjunctiva/sclera: Conjunctivae normal.      Pupils: Pupils are equal, round, and reactive to light.   Cardiovascular:      Rate and Rhythm: Normal rate and regular rhythm.      Heart sounds: Normal heart sounds. No murmur heard.     No friction rub. No gallop.   Pulmonary:      Effort: Pulmonary effort is normal. No respiratory distress.      Breath sounds: Normal breath sounds. No wheezing or rales.   Musculoskeletal:         General: Normal range of motion.   Skin:     General: Skin is warm.      Capillary Refill: Capillary refill takes less than 2 seconds.      Findings: No rash.   Neurological:      Mental Status: She is alert and oriented to person, place, and time.      Motor: No weakness.      Coordination: Coordination is intact. Romberg sign negative. Coordination normal. Finger-Nose-Finger Test normal.      Gait: Gait normal.   Psychiatric:         Mood and Affect: Mood normal.       "   Behavior: Behavior normal.         Thought Content: Thought content normal.         Judgment: Judgment normal.       Assessment:     1. Dizziness and giddiness    2. EARL (generalized anxiety disorder)      Plan:   Dizziness and giddiness  -     CT Head Without Contrast; Future; Expected date: 12/20/2023  -     TSH; Future; Expected date: 12/20/2023  -     CBC Auto Differential; Future; Expected date: 12/20/2023  -     Comprehensive Metabolic Panel; Future; Expected date: 12/20/2023  -     Urinalysis; Future; Expected date: 12/20/2023  -     EKG 12-lead; Future    EARL (generalized anxiety disorder)  -     TSH; Future; Expected date: 12/20/2023    -red flags discussed. If symptoms come back go to ER    Follow up in about 1 week (around 12/27/2023), or if symptoms worsen or fail to improve.

## 2023-12-21 DIAGNOSIS — R42 DIZZINESS: ICD-10-CM

## 2023-12-21 DIAGNOSIS — R94.31 ABNORMAL EKG: Primary | ICD-10-CM

## 2023-12-26 ENCOUNTER — OFFICE VISIT (OUTPATIENT)
Dept: CARDIOLOGY | Facility: CLINIC | Age: 63
End: 2023-12-26
Payer: MEDICARE

## 2023-12-26 ENCOUNTER — HOSPITAL ENCOUNTER (OUTPATIENT)
Dept: RADIOLOGY | Facility: HOSPITAL | Age: 63
Discharge: HOME | End: 2023-12-26
Attending: PHYSICIAN ASSISTANT
Payer: MEDICARE

## 2023-12-26 VITALS
HEIGHT: 69 IN | DIASTOLIC BLOOD PRESSURE: 61 MMHG | SYSTOLIC BLOOD PRESSURE: 101 MMHG | HEART RATE: 71 BPM | BODY MASS INDEX: 22.63 KG/M2 | WEIGHT: 152.75 LBS

## 2023-12-26 DIAGNOSIS — R42 DIZZINESS: ICD-10-CM

## 2023-12-26 DIAGNOSIS — J01.30 ACUTE SPHENOIDAL SINUSITIS, RECURRENCE NOT SPECIFIED: Primary | ICD-10-CM

## 2023-12-26 DIAGNOSIS — R94.31 EKG ABNORMALITIES: Primary | ICD-10-CM

## 2023-12-26 DIAGNOSIS — R42 DIZZINESS AND GIDDINESS: ICD-10-CM

## 2023-12-26 DIAGNOSIS — E78.2 MIXED HYPERLIPIDEMIA: ICD-10-CM

## 2023-12-26 DIAGNOSIS — R94.31 ABNORMAL EKG: ICD-10-CM

## 2023-12-26 PROCEDURE — 1101F PR PT FALLS ASSESS DOC 0-1 FALLS W/OUT INJ PAST YR: ICD-10-PCS | Mod: CPTII,S$GLB,, | Performed by: INTERNAL MEDICINE

## 2023-12-26 PROCEDURE — 99999 PR PBB SHADOW E&M-EST. PATIENT-LVL IV: ICD-10-PCS | Mod: PBBFAC,,, | Performed by: INTERNAL MEDICINE

## 2023-12-26 PROCEDURE — 70450 CT HEAD/BRAIN W/O DYE: CPT | Mod: 26,,, | Performed by: RADIOLOGY

## 2023-12-26 PROCEDURE — 1160F PR REVIEW ALL MEDS BY PRESCRIBER/CLIN PHARMACIST DOCUMENTED: ICD-10-PCS | Mod: CPTII,S$GLB,, | Performed by: INTERNAL MEDICINE

## 2023-12-26 PROCEDURE — 3008F PR BODY MASS INDEX (BMI) DOCUMENTED: ICD-10-PCS | Mod: CPTII,S$GLB,, | Performed by: INTERNAL MEDICINE

## 2023-12-26 PROCEDURE — 3008F BODY MASS INDEX DOCD: CPT | Mod: CPTII,S$GLB,, | Performed by: INTERNAL MEDICINE

## 2023-12-26 PROCEDURE — 99205 PR OFFICE/OUTPT VISIT, NEW, LEVL V, 60-74 MIN: ICD-10-PCS | Mod: S$GLB,,, | Performed by: INTERNAL MEDICINE

## 2023-12-26 PROCEDURE — 99999 PR PBB SHADOW E&M-EST. PATIENT-LVL IV: CPT | Mod: PBBFAC,,, | Performed by: INTERNAL MEDICINE

## 2023-12-26 PROCEDURE — 3078F PR MOST RECENT DIASTOLIC BLOOD PRESSURE < 80 MM HG: ICD-10-PCS | Mod: CPTII,S$GLB,, | Performed by: INTERNAL MEDICINE

## 2023-12-26 PROCEDURE — 3078F DIAST BP <80 MM HG: CPT | Mod: CPTII,S$GLB,, | Performed by: INTERNAL MEDICINE

## 2023-12-26 PROCEDURE — 1159F PR MEDICATION LIST DOCUMENTED IN MEDICAL RECORD: ICD-10-PCS | Mod: CPTII,S$GLB,, | Performed by: INTERNAL MEDICINE

## 2023-12-26 PROCEDURE — 99205 OFFICE O/P NEW HI 60 MIN: CPT | Mod: S$GLB,,, | Performed by: INTERNAL MEDICINE

## 2023-12-26 PROCEDURE — 3074F PR MOST RECENT SYSTOLIC BLOOD PRESSURE < 130 MM HG: ICD-10-PCS | Mod: CPTII,S$GLB,, | Performed by: INTERNAL MEDICINE

## 2023-12-26 PROCEDURE — 3288F PR FALLS RISK ASSESSMENT DOCUMENTED: ICD-10-PCS | Mod: CPTII,S$GLB,, | Performed by: INTERNAL MEDICINE

## 2023-12-26 PROCEDURE — 70450 CT HEAD/BRAIN W/O DYE: CPT | Mod: TC

## 2023-12-26 PROCEDURE — 3074F SYST BP LT 130 MM HG: CPT | Mod: CPTII,S$GLB,, | Performed by: INTERNAL MEDICINE

## 2023-12-26 PROCEDURE — 70450 CT HEAD WITHOUT CONTRAST: ICD-10-PCS | Mod: 26,,, | Performed by: RADIOLOGY

## 2023-12-26 PROCEDURE — 1101F PT FALLS ASSESS-DOCD LE1/YR: CPT | Mod: CPTII,S$GLB,, | Performed by: INTERNAL MEDICINE

## 2023-12-26 PROCEDURE — 3288F FALL RISK ASSESSMENT DOCD: CPT | Mod: CPTII,S$GLB,, | Performed by: INTERNAL MEDICINE

## 2023-12-26 PROCEDURE — 1160F RVW MEDS BY RX/DR IN RCRD: CPT | Mod: CPTII,S$GLB,, | Performed by: INTERNAL MEDICINE

## 2023-12-26 PROCEDURE — 1159F MED LIST DOCD IN RCRD: CPT | Mod: CPTII,S$GLB,, | Performed by: INTERNAL MEDICINE

## 2023-12-26 RX ORDER — AMOXICILLIN AND CLAVULANATE POTASSIUM 875; 125 MG/1; MG/1
1 TABLET, FILM COATED ORAL 2 TIMES DAILY
Qty: 20 TABLET | Refills: 0 | Status: SHIPPED | OUTPATIENT
Start: 2023-12-26 | End: 2024-01-05

## 2023-12-26 NOTE — PROGRESS NOTES
Subjective:   Patient ID:  Latasha Montes is a 63 y.o. female who presents for evaluation of No chief complaint on file.      62 yo female, referred for abnl EKG  C/o episode of dizziness for hours one week ago. Maikol ct showed sinusitis.  No chest pain dyspnea dizziness faint and leg swelling  Walks daily  EKG showed NSR q wave in v1 to v3 suggesting septal infarct  No smoking and drinking  No f/h premature CAD  The 10-year ASCVD risk score (Vlad DK, et al., 2019) is: 3.7%    Values used to calculate the score:      Age: 63 years      Sex: Female      Is Non- : No      Diabetic: No      Tobacco smoker: No      Systolic Blood Pressure: 101 mmHg      Is BP treated: No      HDL Cholesterol: 69 mg/dL      Total Cholesterol: 265 mg/dL        No results found for this or any previous visit.     No results found for this or any previous visit.       Past Medical History:   Diagnosis Date    Encephalitis        Past Surgical History:   Procedure Laterality Date    AUGMENTATION OF BREAST  2005    HAND SURGERY Right 2022    for dupuytren contracture; Dr. Everett Garcia    REPAIR OF LYMPHOCELE  2021    TONGUE BIOPSY  2021       Social History     Tobacco Use    Smoking status: Former     Current packs/day: 0.00     Average packs/day: 0.1 packs/day for 1 year (0.1 ttl pk-yrs)     Types: Cigarettes     Start date:      Quit date:      Years since quittin.0    Smokeless tobacco: Former    Tobacco comments:     1 pack per week for 6 months, quit in    Substance Use Topics    Alcohol use: No    Drug use: Never       Family History   Problem Relation Age of Onset    Hypertension Mother        Review of Systems   Constitutional: Negative for decreased appetite, diaphoresis, fever, malaise/fatigue and night sweats.   HENT:  Negative for nosebleeds.    Eyes:  Negative for blurred vision and double vision.   Cardiovascular:  Negative for chest pain, claudication, dyspnea on  exertion, irregular heartbeat, leg swelling, near-syncope, orthopnea, palpitations, paroxysmal nocturnal dyspnea and syncope.   Respiratory:  Negative for cough, shortness of breath, sleep disturbances due to breathing, snoring, sputum production and wheezing.    Endocrine: Negative for cold intolerance and polyuria.   Hematologic/Lymphatic: Does not bruise/bleed easily.   Skin:  Negative for rash.   Musculoskeletal:  Negative for back pain, falls, joint pain, joint swelling and neck pain.   Gastrointestinal:  Negative for abdominal pain, heartburn, nausea and vomiting.   Genitourinary:  Negative for dysuria, frequency and hematuria.   Neurological:  Negative for difficulty with concentration, dizziness, focal weakness, headaches, light-headedness, numbness, seizures and weakness.   Psychiatric/Behavioral:  Negative for depression, memory loss and substance abuse. The patient does not have insomnia.    Allergic/Immunologic: Negative for HIV exposure and hives.       Objective:   Physical Exam  HENT:      Head: Normocephalic.   Eyes:      Pupils: Pupils are equal, round, and reactive to light.   Neck:      Thyroid: No thyromegaly.      Vascular: Normal carotid pulses. No carotid bruit or JVD.   Cardiovascular:      Rate and Rhythm: Normal rate and regular rhythm. No extrasystoles are present.     Chest Wall: PMI is not displaced.      Pulses: Normal pulses.      Heart sounds: Normal heart sounds. No murmur heard.     No gallop. No S3 sounds.   Pulmonary:      Effort: No respiratory distress.      Breath sounds: Normal breath sounds. No stridor.   Abdominal:      General: Bowel sounds are normal.      Palpations: Abdomen is soft.      Tenderness: There is no abdominal tenderness. There is no rebound.   Musculoskeletal:         General: Normal range of motion.   Skin:     Findings: No rash.   Neurological:      Mental Status: She is alert and oriented to person, place, and time.   Psychiatric:         Behavior:  "Behavior normal.         Lab Results   Component Value Date    CHOL 265 (H) 10/09/2023    CHOL 239 (H) 04/03/2023    CHOL 235 (H) 04/01/2022     Lab Results   Component Value Date    HDL 69 10/09/2023    HDL 79 (H) 04/03/2023    HDL 83 (H) 04/01/2022     Lab Results   Component Value Date    LDLCALC 174.2 (H) 10/09/2023    LDLCALC 145.0 04/03/2023    LDLCALC 140.6 04/01/2022     Lab Results   Component Value Date    TRIG 109 10/09/2023    TRIG 75 04/03/2023    TRIG 57 04/01/2022     Lab Results   Component Value Date    CHOLHDL 26.0 10/09/2023    CHOLHDL 33.1 04/03/2023    CHOLHDL 35.3 04/01/2022       Chemistry        Component Value Date/Time     12/20/2023 1213    K 5.0 12/20/2023 1213     12/20/2023 1213    CO2 29 12/20/2023 1213    BUN 15 12/20/2023 1213    CREATININE 0.9 12/20/2023 1213    GLU 82 12/20/2023 1213        Component Value Date/Time    CALCIUM 9.9 12/20/2023 1213    ALKPHOS 54 (L) 12/20/2023 1213    AST 24 12/20/2023 1213    ALT 19 12/20/2023 1213    BILITOT 0.6 12/20/2023 1213    ESTGFRAFRICA >60.0 04/01/2022 1440    EGFRNONAA >60.0 04/01/2022 1440          No results found for: "LABA1C", "HGBA1C"  Lab Results   Component Value Date    TSH 1.255 12/20/2023     No results found for: "INR", "PROTIME"  Lab Results   Component Value Date    WBC 5.68 12/20/2023    HGB 13.8 12/20/2023    HCT 41.3 12/20/2023    MCV 90 12/20/2023     12/20/2023     BNP  @LABRCNTIP(BNP,BNPTRIAGEBLO)@  Estimated Creatinine Clearance: 65.1 mL/min (based on SCr of 0.9 mg/dL).  No results found in the last 24 hours.  No results found in the last 24 hours.  No results found in the last 24 hours.    Assessment:      1. EKG abnormalities    2. Abnormal EKG    3. Dizziness    4. Mixed hyperlipidemia        Plan:   EKG abnormalities    Abnormal EKG  -     Ambulatory referral/consult to Cardiology    Dizziness  -     Ambulatory referral/consult to Cardiology    Mixed hyperlipidemia          Echo and exercise MPI " for abnl EKG dizziness and h/o HLD  Phone review    Counseled DASH  Check Lipid profile with PCP in 6 months  Recommend heart-healthy diet, weight control and regular exercise.  Rasta. Risk modification.   I have reviewed all pertinent labs and cardiac studies independently. Plans and recommendations have been formulated under my direct supervision. All questions answered and patient voiced understanding.   If symptoms persist go to the ED  RTC as needed

## 2023-12-27 ENCOUNTER — TELEPHONE (OUTPATIENT)
Dept: CARDIOLOGY | Facility: HOSPITAL | Age: 63
End: 2023-12-27
Payer: MEDICARE

## 2023-12-27 ENCOUNTER — OFFICE VISIT (OUTPATIENT)
Dept: OTOLARYNGOLOGY | Facility: CLINIC | Age: 63
End: 2023-12-27
Payer: MEDICARE

## 2023-12-27 VITALS — WEIGHT: 153 LBS | BODY MASS INDEX: 22.59 KG/M2

## 2023-12-27 DIAGNOSIS — J34.89 NASAL VESTIBULITIS: Primary | ICD-10-CM

## 2023-12-27 DIAGNOSIS — J01.30 ACUTE SPHENOIDAL SINUSITIS, RECURRENCE NOT SPECIFIED: ICD-10-CM

## 2023-12-27 DIAGNOSIS — J34.2 NASAL SEPTAL DEVIATION: ICD-10-CM

## 2023-12-27 DIAGNOSIS — M95.0 NASAL VALVE COLLAPSE: ICD-10-CM

## 2023-12-27 DIAGNOSIS — J34.3 HYPERTROPHY OF INFERIOR NASAL TURBINATE: ICD-10-CM

## 2023-12-27 DIAGNOSIS — H93.13 TINNITUS AURIUM, BILATERAL: ICD-10-CM

## 2023-12-27 PROCEDURE — 1159F PR MEDICATION LIST DOCUMENTED IN MEDICAL RECORD: ICD-10-PCS | Mod: CPTII,S$GLB,, | Performed by: OTOLARYNGOLOGY

## 2023-12-27 PROCEDURE — 3008F BODY MASS INDEX DOCD: CPT | Mod: CPTII,S$GLB,, | Performed by: OTOLARYNGOLOGY

## 2023-12-27 PROCEDURE — 99204 PR OFFICE/OUTPT VISIT, NEW, LEVL IV, 45-59 MIN: ICD-10-PCS | Mod: S$GLB,,, | Performed by: OTOLARYNGOLOGY

## 2023-12-27 PROCEDURE — 1101F PR PT FALLS ASSESS DOC 0-1 FALLS W/OUT INJ PAST YR: ICD-10-PCS | Mod: CPTII,S$GLB,, | Performed by: OTOLARYNGOLOGY

## 2023-12-27 PROCEDURE — 99204 OFFICE O/P NEW MOD 45 MIN: CPT | Mod: S$GLB,,, | Performed by: OTOLARYNGOLOGY

## 2023-12-27 PROCEDURE — 3288F FALL RISK ASSESSMENT DOCD: CPT | Mod: CPTII,S$GLB,, | Performed by: OTOLARYNGOLOGY

## 2023-12-27 PROCEDURE — 3008F PR BODY MASS INDEX (BMI) DOCUMENTED: ICD-10-PCS | Mod: CPTII,S$GLB,, | Performed by: OTOLARYNGOLOGY

## 2023-12-27 PROCEDURE — 3288F PR FALLS RISK ASSESSMENT DOCUMENTED: ICD-10-PCS | Mod: CPTII,S$GLB,, | Performed by: OTOLARYNGOLOGY

## 2023-12-27 PROCEDURE — 99999 PR PBB SHADOW E&M-EST. PATIENT-LVL III: CPT | Mod: PBBFAC,,, | Performed by: OTOLARYNGOLOGY

## 2023-12-27 PROCEDURE — 1159F MED LIST DOCD IN RCRD: CPT | Mod: CPTII,S$GLB,, | Performed by: OTOLARYNGOLOGY

## 2023-12-27 PROCEDURE — 1101F PT FALLS ASSESS-DOCD LE1/YR: CPT | Mod: CPTII,S$GLB,, | Performed by: OTOLARYNGOLOGY

## 2023-12-27 PROCEDURE — 99999 PR PBB SHADOW E&M-EST. PATIENT-LVL III: ICD-10-PCS | Mod: PBBFAC,,, | Performed by: OTOLARYNGOLOGY

## 2023-12-27 RX ORDER — MUPIROCIN 20 MG/G
OINTMENT TOPICAL 2 TIMES DAILY
Qty: 15 G | Refills: 3 | Status: SHIPPED | OUTPATIENT
Start: 2023-12-27 | End: 2024-01-06

## 2023-12-27 NOTE — PROGRESS NOTES
Referring Provider:    Peg Rodriguez Pa-c  [address] [facility] [place] [address]  Subjective:   Patient: Latasha Montes [id number], :1960   Visit date:2023 10:15 AM    Chief Complaint:  Other (Has a sore in her nose that is tender, says she gets it occasionally. Also has some dizziness , had a ct done recently , ringing in ears )    HPI:    Prior notes reviewed by myself.  Clinical documentation obtained by nursing staff reviewed.     62 y/o female here for multiple complaints.  She had a recent episode of dizziness that lasted for several hours and was debilitating.  She reports that this episode was consistent with spinning tinnitus/true vertigo.  These symptoms have since resolved.  She was seen by her PCP for this and had a CT head which showed evidence of possible sphenoid sinusitis, so an ENT evaluation was recommended.  She was prescribed augmentin, but she hasn't started this yet.  She reports intermittent bitemporal headaches.  She denies any discolored drainage, congestion, facial pain, upper tooth pain.  No recent history of recurrent sinus infection.  She does report intermittent sores/crusting in her nostrils.  She treats these with colloidal silver gel/ointment.  She reports long term bilateral tinnitus that she describes intermittently being both pulsatile and nonpulsatile.  She has not had a recent audiogram.      Objective:     Physical Exam:  Vitals:  Wt 69.4 kg (153 lb)   BMI 22.59 kg/m²   General appearance:  Well developed, well nourished    Ears:  Otoscopy of external auditory canals and tympanic membranes was normal, clinical speech reception thresholds grossly intact, no mass/lesion of auricle.    Nose:  No masses/lesions of external nose, nasal mucosa with small scabbed area in left nasal vestibule, septum deviated 3+ left, and turbinates were hypertrophied 3+. Right nasal valve collapse with positive modified hilary maneuver.    Mouth:  No mass/lesion of  lips, teeth, gums, hard/soft palate, tongue, tonsils, or oropharynx.    Neck & Lymphatics:  No cervical lymphadenopathy, no neck mass/crepitus/ asymmetry, trachea is midline, no thyroid enlargement/tenderness/mass.        [x]  Data Reviewed:    Lab Results   Component Value Date    WBC 5.68 12/20/2023    HGB 13.8 12/20/2023    HCT 41.3 12/20/2023    MCV 90 12/20/2023    EOSINOPHIL 1.4 12/20/2023         [x]  Independent interpretation of test: Trace fluid in sphenoid sinus  CT Head Without Contrast  Order: [id number]  Status: Final result       Visible to patient: No (inaccessible in Patient Portal)       Next appt: 01/19/2024 at 08:15 AM in Radiology ([facility] NM2 CAM1 CARDIAC)       Dx: Dizziness and giddiness    2 Result Notes  Details    Reading Physician Reading Date Result Priority   Perfecto Gandhi MD  [phone] 12/26/2023      Narrative & Impression  EXAM: CT HEAD WITHOUT CONTRAST     CLINICAL INDICATION: Dizziness     TECHNIQUE: Axial and multiplanar 2-D reformations provided.  Without IV contrast     PRIORS: None     FINDINGS:  1.  No evidence for acute intracranial hemorrhage, mass nor midline shift     2.  Early global atrophic changes symmetrically  3.  Mastoids, orbits are clear.  4.  Sphenoid sinus disease with evidence of air-fluid levels and air bubbles suggesting of acute sphenoid sinusitis  5.  Skull is intact        Impression:     1.  No evidence for acute intracranial hemorrhage, mass nor midline shift     2.  Sphenoid sinus air-fluid levels with air bubbles consistent with acute sphenoid sinusitis (this has been associated with neurological symptoms)     All CT scans at [this location] are performed using dose modulation techniques as appropriate to a performed exam including the following:  Automated exposure control; adjustment of the mA and/or kV according to patient size (this includes techniques or standardized protocols for targeted exams where dose is matched to indication / reason for  exam; i.e. extremities or head); use of iterative reconstruction technique.     Finalized on: 12/26/2023 7:56 AM By:  Perfecto Gandhi MD  R# [id number]      2023-12-26 07:58:10.731    CORNELIAG             Assessment & Plan:   Nasal vestibulitis  -     mupirocin (BACTROBAN) 2 % ointment; by Nasal route 2 (two) times daily. Apply to nose twice daily for 10 days  Dispense: 15 g; Refill: 3    Acute sphenoidal sinusitis, recurrence not specified  -     Ambulatory referral/consult to ENT    Tinnitus aurium, bilateral    Nasal septal deviation    Hypertrophy of inferior nasal turbinate    Nasal valve collapse        We discussed her history, exam and CT images in detail.  She had a small amount of fluid noted in her sphenoid sinus.  She has no other symptoms of acute or chronic sinusitis, so I am not sure that this truly represented an acute sinusitis.  We discussed the pros and cons of starting her antibiotic and she will consider her options.  We discussed her episode of dizziness and she understands that there is an option to pursue a full vestibular workup.  Seeing as she is asymptomatic at this point, she is not interested in this.     She does have recurrent sores in her nose consistent with nasal vestibulitis.  She has had some partial success in treating these with colloidal silver, but I recommended that she consider trying mupirocin ointment.  We discussed her long term tinnitus and I recommended an updated audiogram.  We also discussed her multifactorial nasal obstruction.  We briefly discussed the surgical and nonsurgical options for this and she will consider.

## 2024-01-16 ENCOUNTER — TELEPHONE (OUTPATIENT)
Dept: CARDIOLOGY | Facility: HOSPITAL | Age: 64
End: 2024-01-16
Payer: MEDICARE

## 2024-01-16 ENCOUNTER — TELEPHONE (OUTPATIENT)
Dept: OTOLARYNGOLOGY | Facility: CLINIC | Age: 64
End: 2024-01-16
Payer: MEDICARE

## 2024-01-16 NOTE — TELEPHONE ENCOUNTER
----- Message from Magalis Erich sent at 1/16/2024  1:44 PM CST -----  Contact: pt  Pt is calling in regard to needing to shane her appt for 1/22, please call her back brandon shane at  [phone] thanks/mpd  (appt cancelled in book it)

## 2024-01-16 NOTE — TELEPHONE ENCOUNTER
Spoke to pt who states she has already rescheduled her appt for audio. Instructed to call back if and when she was ready for further intervention. Voiced understanding.

## 2024-01-16 NOTE — TELEPHONE ENCOUNTER
----- Message from Magalis Jung sent at 1/16/2024  1:40 PM CST -----  Contact: pt  Pt is calling in regard to needing to shane her apps on 1/19 to the month of February.  Please call her   [phone]  thanks/mpd

## 2024-02-12 ENCOUNTER — HOSPITAL ENCOUNTER (OUTPATIENT)
Dept: RADIOLOGY | Facility: HOSPITAL | Age: 64
Discharge: HOME | End: 2024-02-12
Attending: INTERNAL MEDICINE
Payer: MEDICARE

## 2024-02-12 ENCOUNTER — HOSPITAL ENCOUNTER (OUTPATIENT)
Dept: CARDIOLOGY | Facility: HOSPITAL | Age: 64
Discharge: HOME | End: 2024-02-12
Attending: INTERNAL MEDICINE
Payer: MEDICARE

## 2024-02-12 ENCOUNTER — CLINICAL SUPPORT (OUTPATIENT)
Dept: AUDIOLOGY | Facility: CLINIC | Age: 64
End: 2024-02-12
Payer: MEDICARE

## 2024-02-12 VITALS
SYSTOLIC BLOOD PRESSURE: 101 MMHG | HEIGHT: 69 IN | DIASTOLIC BLOOD PRESSURE: 61 MMHG | BODY MASS INDEX: 22.66 KG/M2 | WEIGHT: 153 LBS

## 2024-02-12 DIAGNOSIS — R42 DIZZINESS: ICD-10-CM

## 2024-02-12 DIAGNOSIS — E78.2 MIXED HYPERLIPIDEMIA: ICD-10-CM

## 2024-02-12 DIAGNOSIS — R94.31 EKG ABNORMALITIES: ICD-10-CM

## 2024-02-12 DIAGNOSIS — H93.13 SUBJECTIVE TINNITUS OF BOTH EARS: ICD-10-CM

## 2024-02-12 DIAGNOSIS — H90.3 SENSORINEURAL HEARING LOSS, BILATERAL: Primary | ICD-10-CM

## 2024-02-12 LAB
CV STRESS BASE HR: 69 BPM
DIASTOLIC BLOOD PRESSURE: 76 MMHG
NUC REST EJECTION FRACTION: 75
NUC STRESS EJECTION FRACTION: 72 %
OHS CV CPX 85 PERCENT MAX PREDICTED HEART RATE MALE: 132
OHS CV CPX MAX PREDICTED HEART RATE: 155
OHS CV CPX PATIENT IS FEMALE: 1
OHS CV CPX PATIENT IS MALE: 0
OHS CV CPX PEAK DIASTOLIC BLOOD PRESSURE: 70 MMHG
OHS CV CPX PEAK HEAR RATE: 142 BPM
OHS CV CPX PEAK RATE PRESSURE PRODUCT: NORMAL
OHS CV CPX PEAK SYSTOLIC BLOOD PRESSURE: 196 MMHG
OHS CV CPX PERCENT MAX PREDICTED HEART RATE ACHIEVED: 95
OHS CV CPX RATE PRESSURE PRODUCT PRESENTING: NORMAL
STRESS ECHO POST EXERCISE DUR MIN: 6 MINUTES
STRESS ECHO POST EXERCISE DUR SEC: 4 SECONDS
STRESS ST DEPRESSION: 1 MM
SYSTOLIC BLOOD PRESSURE: 164 MMHG

## 2024-02-12 PROCEDURE — 93016 CV STRESS TEST SUPVJ ONLY: CPT | Mod: ,,, | Performed by: STUDENT IN AN ORGANIZED HEALTH CARE EDUCATION/TRAINING PROGRAM

## 2024-02-12 PROCEDURE — 78452 HT MUSCLE IMAGE SPECT MULT: CPT | Mod: 26,,, | Performed by: STUDENT IN AN ORGANIZED HEALTH CARE EDUCATION/TRAINING PROGRAM

## 2024-02-12 PROCEDURE — 93018 CV STRESS TEST I&R ONLY: CPT | Mod: ,,, | Performed by: STUDENT IN AN ORGANIZED HEALTH CARE EDUCATION/TRAINING PROGRAM

## 2024-02-12 PROCEDURE — 92567 TYMPANOMETRY: CPT | Mod: S$GLB,,,

## 2024-02-12 PROCEDURE — 93306 TTE W/DOPPLER COMPLETE: CPT

## 2024-02-12 PROCEDURE — 92557 COMPREHENSIVE HEARING TEST: CPT | Mod: S$GLB,,,

## 2024-02-12 PROCEDURE — 93306 TTE W/DOPPLER COMPLETE: CPT | Mod: 26,,, | Performed by: STUDENT IN AN ORGANIZED HEALTH CARE EDUCATION/TRAINING PROGRAM

## 2024-02-12 PROCEDURE — 93017 CV STRESS TEST TRACING ONLY: CPT

## 2024-02-12 NOTE — PROGRESS NOTES
Latasha Montes was seen 02/12/2024 for an audiological evaluation. Patient complains of tinnitus in both ears for many years. She noted hearing three different sounds: high pitch ringing, pulsatile thumping, and a third tone. Patient also reported difficulties hearing in both ears. 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 Patient denied recent dizziness and family history of hearing loss.     Otoscopy revealed clear canals with visualization of the tympanic membrane in both ears. Tympanograms were Type C for the right ear and Type Ad for the left ear. Audiometry revealed a mild sensorineural hearing loss for the right ear, and a mild-to-moderate sensorineural hearing loss for the left ear. Speech Reception Thresholds were  30 dBHL for the right ear and 25 dBHL for the left ear. Word recognition scores were excellent for the right ear and excellent for the left ear.    Patient was counseled on the above findings. We discussed that tinnitus is most often caused by a hearing loss or repeated noise exposure. As the hair cells are damaged, either genetic or as a result of loud noise exposure, they then cause tinnitus- which is believe to be our brains generating sounds that we are no longer hearing. Unfortunately there is no proven cure for tinnitus, however there are different management options. People with hearing loss and tinnitus can find a reduction in their tinnitus with use of hearing aids. We discussed use of external maskers (e.g., sound machines) and personal tinnitus maskers. Research has also shown benefits from cognitive behavioral therapy or tinnitus retraining therapy. Some patients find that restricting the salt or caffeine in their diet helps, and there is also an OTC supplement, lipflavinoids, that some people find to be effective though their benefit is not fully proven. Tinnitus tends to be louder in times of stress and fatigue, and patients may benefit from  meditation and yoga.     Recommendations:  Follow-up with ENT, as scheduled.  Repeat audiological evaluation in one to two years to monitor hearing, or sooner if needed.  Consider hearing aid consult.

## 2024-02-13 ENCOUNTER — TELEPHONE (OUTPATIENT)
Dept: CARDIOLOGY | Facility: CLINIC | Age: 64
End: 2024-02-13
Payer: MEDICARE

## 2024-02-13 ENCOUNTER — TELEPHONE (OUTPATIENT)
Dept: OBSTETRICS AND GYNECOLOGY | Facility: CLINIC | Age: 64
End: 2024-02-13
Payer: MEDICARE

## 2024-02-13 DIAGNOSIS — Z12.31 SCREENING MAMMOGRAM FOR BREAST CANCER: Primary | ICD-10-CM

## 2024-02-13 LAB
AORTIC ROOT ANNULUS: 2.58 CM
ASCENDING AORTA: 2.58 CM
AV INDEX (PROSTH): 0.66
AV MEAN GRADIENT: 4 MMHG
AV PEAK GRADIENT: 8 MMHG
AV VALVE AREA BY VELOCITY RATIO: 2.37 CM²
AV VALVE AREA: 2.17 CM²
AV VELOCITY RATIO: 0.73
BSA FOR ECHO PROCEDURE: 1.84 M2
CV ECHO LV RWT: 0.52 CM
DOP CALC AO PEAK VEL: 1.42 M/S
DOP CALC AO VTI: 33.3 CM
DOP CALC LVOT AREA: 3.3 CM2
DOP CALC LVOT DIAMETER: 2.04 CM
DOP CALC LVOT PEAK VEL: 1.03 M/S
DOP CALC LVOT STROKE VOLUME: 72.2 CM3
DOP CALC RVOT PEAK VEL: 0.52 M/S
DOP CALC RVOT VTI: 13.2 CM
DOP CALCLVOT PEAK VEL VTI: 22.1 CM
E WAVE DECELERATION TIME: 256.84 MSEC
E/A RATIO: 1.26
E/E' RATIO: 7.88 M/S
ECHO LV POSTERIOR WALL: 1.09 CM (ref 0.6–1.1)
EJECTION FRACTION: 65 %
FRACTIONAL SHORTENING: 45 % (ref 28–44)
INTERVENTRICULAR SEPTUM: 0.88 CM (ref 0.6–1.1)
IVC DIAMETER: 1.66 CM
IVRT: 65.65 MSEC
LA MAJOR: 4.34 CM
LA MINOR: 4.44 CM
LA WIDTH: 3.3 CM
LEFT ATRIUM SIZE: 3.22 CM
LEFT ATRIUM VOLUME INDEX MOD: 15.3 ML/M2
LEFT ATRIUM VOLUME INDEX: 21.5 ML/M2
LEFT ATRIUM VOLUME MOD: 28.06 CM3
LEFT ATRIUM VOLUME: 39.65 CM3
LEFT INTERNAL DIMENSION IN SYSTOLE: 2.29 CM (ref 2.1–4)
LEFT VENTRICLE DIASTOLIC VOLUME INDEX: 42.28 ML/M2
LEFT VENTRICLE DIASTOLIC VOLUME: 77.8 ML
LEFT VENTRICLE MASS INDEX: 72 G/M2
LEFT VENTRICLE SYSTOLIC VOLUME INDEX: 9.7 ML/M2
LEFT VENTRICLE SYSTOLIC VOLUME: 17.89 ML
LEFT VENTRICULAR INTERNAL DIMENSION IN DIASTOLE: 4.18 CM (ref 3.5–6)
LEFT VENTRICULAR MASS: 133.37 G
LV LATERAL E/E' RATIO: 6.7 M/S
LV SEPTAL E/E' RATIO: 9.57 M/S
LVOT MG: 2.37 MMHG
LVOT MV: 0.73 CM/S
MV PEAK A VEL: 0.53 M/S
MV PEAK E VEL: 0.67 M/S
MV STENOSIS PRESSURE HALF TIME: 74.48 MS
MV VALVE AREA P 1/2 METHOD: 2.95 CM2
PISA TR MAX VEL: 2.33 M/S
PULM VEIN S/D RATIO: 1.56
PV MEAN GRADIENT: 1 MMHG
PV MV: 0.58 M/S
PV PEAK D VEL: 0.34 M/S
PV PEAK GRADIENT: 3 MMHG
PV PEAK S VEL: 0.53 M/S
PV PEAK VELOCITY: 0.8 M/S
RA MAJOR: 4.36 CM
RA PRESSURE ESTIMATED: 3 MMHG
RA WIDTH: 3.22 CM
RIGHT VENTRICULAR END-DIASTOLIC DIMENSION: 2.6 CM
RV TB RVSP: 5 MMHG
RV TISSUE DOPPLER FREE WALL SYSTOLIC VELOCITY 1 (APICAL 4 CHAMBER VIEW): 12.48 CM/S
SINUS: 2.62 CM
STJ: 2.56 CM
TDI LATERAL: 0.1 M/S
TDI SEPTAL: 0.07 M/S
TDI: 0.09 M/S
TR MAX PG: 22 MMHG
TRICUSPID ANNULAR PLANE SYSTOLIC EXCURSION: 2.16 CM
TV REST PULMONARY ARTERY PRESSURE: 25 MMHG
Z-SCORE OF LEFT VENTRICULAR DIMENSION IN END DIASTOLE: -2
Z-SCORE OF LEFT VENTRICULAR DIMENSION IN END SYSTOLE: -2.52

## 2024-02-13 NOTE — TELEPHONE ENCOUNTER
Spoke to patient and she was returning a call. Rescheduled mammogram per patient request. Call ended.

## 2024-02-13 NOTE — TELEPHONE ENCOUNTER
----- Message from Melony Gonzalez sent at 2/13/2024  9:30 AM CST -----  Name of Who is Calling:pt           What is the request in detail:patient is requesting the mammo order so that she can schedule on the same day as her annual on 04/08           Can the clinic reply by [facility]:no           [facility] Back [facility]: [phone]

## 2024-02-13 NOTE — TELEPHONE ENCOUNTER
Spoke with pt in regards to test results. Pt verbalized understanding information.                   ----- Message from Mau Lloyd MD sent at 2/13/2024  3:55 PM CST -----  Continue low fat diet  Rtc in 1 yr

## 2024-02-13 NOTE — TELEPHONE ENCOUNTER
----- Message from Pennie Patel sent at 2/13/2024 10:00 AM CST -----  Contact: Latasha  .Type:  Patient Returning Call    Who Called:Latasha  Who Left Message for Patient:Nurse  Does the patient know what this is regarding?:Yes  Would the patient rather a call back or a response via [facility]? Call back  Best Call Back Number:[phone]  Additional Information: Na      Thanks  TABITHA

## 2024-02-13 NOTE — TELEPHONE ENCOUNTER
Attempted to contact patient. No answer. Unable to leave message. Wanted to inform patient that she already has an order for a mammo in the system. The first available appt at the [facility] for a mammogram is in may.

## 2024-02-13 NOTE — TELEPHONE ENCOUNTER
Spoke with pt in regards to test results. Pt verbalized understanding information.           ----- Message from Mau Lloyd MD sent at 2/13/2024  3:54 PM CST -----  [facility] stress test nml. No ischemia

## 2024-04-08 ENCOUNTER — OFFICE VISIT (OUTPATIENT)
Dept: OBSTETRICS AND GYNECOLOGY | Facility: CLINIC | Age: 64
End: 2024-04-08
Payer: MEDICARE

## 2024-04-08 ENCOUNTER — OFFICE VISIT (OUTPATIENT)
Dept: INTERNAL MEDICINE | Facility: CLINIC | Age: 64
End: 2024-04-08
Payer: MEDICARE

## 2024-04-08 VITALS
WEIGHT: 155.19 LBS | SYSTOLIC BLOOD PRESSURE: 106 MMHG | HEIGHT: 69 IN | BODY MASS INDEX: 22.98 KG/M2 | DIASTOLIC BLOOD PRESSURE: 65 MMHG

## 2024-04-08 VITALS
HEIGHT: 69 IN | WEIGHT: 155.19 LBS | HEART RATE: 86 BPM | OXYGEN SATURATION: 98 % | BODY MASS INDEX: 22.98 KG/M2 | DIASTOLIC BLOOD PRESSURE: 70 MMHG | TEMPERATURE: 97 F | SYSTOLIC BLOOD PRESSURE: 118 MMHG

## 2024-04-08 DIAGNOSIS — M85.80 OSTEOPENIA, UNSPECIFIED LOCATION: ICD-10-CM

## 2024-04-08 DIAGNOSIS — Z00.00 ROUTINE GENERAL MEDICAL EXAMINATION AT A HEALTH CARE FACILITY: Primary | ICD-10-CM

## 2024-04-08 DIAGNOSIS — E78.2 MIXED HYPERLIPIDEMIA: ICD-10-CM

## 2024-04-08 DIAGNOSIS — Z01.419 ENCOUNTER FOR GYNECOLOGICAL EXAMINATION WITHOUT ABNORMAL FINDING: Primary | ICD-10-CM

## 2024-04-08 DIAGNOSIS — J37.0 CHRONIC LARYNGITIS: ICD-10-CM

## 2024-04-08 DIAGNOSIS — F41.1 GAD (GENERALIZED ANXIETY DISORDER): ICD-10-CM

## 2024-04-08 DIAGNOSIS — Z78.0 POSTMENOPAUSAL: ICD-10-CM

## 2024-04-08 LAB
ALBUMIN SERPL BCP-MCNC: 4 G/DL (ref 3.5–5.2)
ALP SERPL-CCNC: 60 U/L (ref 55–135)
ALT SERPL W/O P-5'-P-CCNC: 23 U/L (ref 10–44)
ANION GAP SERPL CALC-SCNC: 10 MMOL/L (ref 8–16)
AST SERPL-CCNC: 27 U/L (ref 10–40)
BASOPHILS # BLD AUTO: 0.02 K/UL (ref 0–0.2)
BASOPHILS NFR BLD: 0.5 % (ref 0–1.9)
BILIRUB SERPL-MCNC: 0.3 MG/DL (ref 0.1–1)
BILIRUB UR QL STRIP: NEGATIVE
BUN SERPL-MCNC: 10 MG/DL (ref 8–23)
CALCIUM SERPL-MCNC: 9.4 MG/DL (ref 8.7–10.5)
CAOX CRY URNS QL MICRO: NORMAL
CHLORIDE SERPL-SCNC: 105 MMOL/L (ref 95–110)
CHOLEST SERPL-MCNC: 270 MG/DL (ref 120–199)
CHOLEST/HDLC SERPL: 3.4 {RATIO} (ref 2–5)
CLARITY UR: CLEAR
CO2 SERPL-SCNC: 25 MMOL/L (ref 23–29)
COLOR UR: YELLOW
CREAT SERPL-MCNC: 0.8 MG/DL (ref 0.5–1.4)
DIFFERENTIAL METHOD BLD: NORMAL
EOSINOPHIL # BLD AUTO: 0.1 K/UL (ref 0–0.5)
EOSINOPHIL NFR BLD: 2.8 % (ref 0–8)
ERYTHROCYTE [DISTWIDTH] IN BLOOD BY AUTOMATED COUNT: 12.3 % (ref 11.5–14.5)
EST. GFR  (NO RACE VARIABLE): >60 ML/MIN/1.73 M^2
ESTIMATED AVG GLUCOSE: 105 MG/DL (ref 68–131)
GLUCOSE SERPL-MCNC: 74 MG/DL (ref 70–110)
GLUCOSE UR QL STRIP: NEGATIVE
HBA1C MFR BLD: 5.3 % (ref 4–5.6)
HCT VFR BLD AUTO: 45 % (ref 37–48.5)
HDLC SERPL-MCNC: 79 MG/DL (ref 40–75)
HDLC SERPL: 29.3 % (ref 20–50)
HGB BLD-MCNC: 14.4 G/DL (ref 12–16)
HGB UR QL STRIP: NEGATIVE
IMM GRANULOCYTES # BLD AUTO: 0.01 K/UL (ref 0–0.04)
IMM GRANULOCYTES NFR BLD AUTO: 0.2 % (ref 0–0.5)
KETONES UR QL STRIP: NEGATIVE
LDLC SERPL CALC-MCNC: 173.8 MG/DL (ref 63–159)
LEUKOCYTE ESTERASE UR QL STRIP: ABNORMAL
LYMPHOCYTES # BLD AUTO: 1.5 K/UL (ref 1–4.8)
LYMPHOCYTES NFR BLD: 35 % (ref 18–48)
MCH RBC QN AUTO: 30 PG (ref 27–31)
MCHC RBC AUTO-ENTMCNC: 32 G/DL (ref 32–36)
MCV RBC AUTO: 94 FL (ref 82–98)
MICROSCOPIC COMMENT: NORMAL
MONOCYTES # BLD AUTO: 0.4 K/UL (ref 0.3–1)
MONOCYTES NFR BLD: 8.5 % (ref 4–15)
NEUTROPHILS # BLD AUTO: 2.3 K/UL (ref 1.8–7.7)
NEUTROPHILS NFR BLD: 53 % (ref 38–73)
NITRITE UR QL STRIP: NEGATIVE
NONHDLC SERPL-MCNC: 191 MG/DL
NRBC BLD-RTO: 0 /100 WBC
PH UR STRIP: 6 [PH] (ref 5–8)
PLATELET # BLD AUTO: 260 K/UL (ref 150–450)
PMV BLD AUTO: 10 FL (ref 9.2–12.9)
POTASSIUM SERPL-SCNC: 4.7 MMOL/L (ref 3.5–5.1)
PROT SERPL-MCNC: 7.2 G/DL (ref 6–8.4)
PROT UR QL STRIP: NEGATIVE
RBC # BLD AUTO: 4.8 M/UL (ref 4–5.4)
SODIUM SERPL-SCNC: 140 MMOL/L (ref 136–145)
SP GR UR STRIP: 1.02 (ref 1–1.03)
TRIGL SERPL-MCNC: 86 MG/DL (ref 30–150)
TSH SERPL DL<=0.005 MIU/L-ACNC: 1.3 UIU/ML (ref 0.4–4)
URN SPEC COLLECT METH UR: ABNORMAL
WBC # BLD AUTO: 4.26 K/UL (ref 3.9–12.7)
WBC #/AREA URNS HPF: 0 /HPF (ref 0–5)

## 2024-04-08 PROCEDURE — 99397 PER PM REEVAL EST PAT 65+ YR: CPT | Mod: S$GLB,,, | Performed by: FAMILY MEDICINE

## 2024-04-08 PROCEDURE — 99999 PR PBB SHADOW E&M-EST. PATIENT-LVL IV: CPT | Mod: PBBFAC,,, | Performed by: FAMILY MEDICINE

## 2024-04-08 PROCEDURE — 3288F FALL RISK ASSESSMENT DOCD: CPT | Mod: CPTII,S$GLB,, | Performed by: NURSE PRACTITIONER

## 2024-04-08 PROCEDURE — 1160F RVW MEDS BY RX/DR IN RCRD: CPT | Mod: CPTII,S$GLB,, | Performed by: NURSE PRACTITIONER

## 2024-04-08 PROCEDURE — 1160F RVW MEDS BY RX/DR IN RCRD: CPT | Mod: CPTII,S$GLB,, | Performed by: FAMILY MEDICINE

## 2024-04-08 PROCEDURE — 81000 URINALYSIS NONAUTO W/SCOPE: CPT | Performed by: FAMILY MEDICINE

## 2024-04-08 PROCEDURE — 3074F SYST BP LT 130 MM HG: CPT | Mod: CPTII,S$GLB,, | Performed by: NURSE PRACTITIONER

## 2024-04-08 PROCEDURE — G0101 CA SCREEN;PELVIC/BREAST EXAM: HCPCS | Mod: S$GLB,,, | Performed by: NURSE PRACTITIONER

## 2024-04-08 PROCEDURE — 99999 PR PBB SHADOW E&M-EST. PATIENT-LVL IV: CPT | Mod: PBBFAC,,, | Performed by: NURSE PRACTITIONER

## 2024-04-08 PROCEDURE — 85025 COMPLETE CBC W/AUTO DIFF WBC: CPT | Performed by: FAMILY MEDICINE

## 2024-04-08 PROCEDURE — 1101F PT FALLS ASSESS-DOCD LE1/YR: CPT | Mod: CPTII,S$GLB,, | Performed by: NURSE PRACTITIONER

## 2024-04-08 PROCEDURE — 83036 HEMOGLOBIN GLYCOSYLATED A1C: CPT | Performed by: FAMILY MEDICINE

## 2024-04-08 PROCEDURE — 3074F SYST BP LT 130 MM HG: CPT | Mod: CPTII,S$GLB,, | Performed by: FAMILY MEDICINE

## 2024-04-08 PROCEDURE — 1101F PT FALLS ASSESS-DOCD LE1/YR: CPT | Mod: CPTII,S$GLB,, | Performed by: FAMILY MEDICINE

## 2024-04-08 PROCEDURE — 3078F DIAST BP <80 MM HG: CPT | Mod: CPTII,S$GLB,, | Performed by: NURSE PRACTITIONER

## 2024-04-08 PROCEDURE — 80053 COMPREHEN METABOLIC PANEL: CPT | Performed by: FAMILY MEDICINE

## 2024-04-08 PROCEDURE — 84443 ASSAY THYROID STIM HORMONE: CPT | Performed by: FAMILY MEDICINE

## 2024-04-08 PROCEDURE — 3288F FALL RISK ASSESSMENT DOCD: CPT | Mod: CPTII,S$GLB,, | Performed by: FAMILY MEDICINE

## 2024-04-08 PROCEDURE — 1159F MED LIST DOCD IN RCRD: CPT | Mod: CPTII,S$GLB,, | Performed by: NURSE PRACTITIONER

## 2024-04-08 PROCEDURE — 1126F AMNT PAIN NOTED NONE PRSNT: CPT | Mod: CPTII,S$GLB,, | Performed by: NURSE PRACTITIONER

## 2024-04-08 PROCEDURE — 80061 LIPID PANEL: CPT | Performed by: FAMILY MEDICINE

## 2024-04-08 PROCEDURE — 3078F DIAST BP <80 MM HG: CPT | Mod: CPTII,S$GLB,, | Performed by: FAMILY MEDICINE

## 2024-04-08 PROCEDURE — 1159F MED LIST DOCD IN RCRD: CPT | Mod: CPTII,S$GLB,, | Performed by: FAMILY MEDICINE

## 2024-04-08 PROCEDURE — 3008F BODY MASS INDEX DOCD: CPT | Mod: CPTII,S$GLB,, | Performed by: FAMILY MEDICINE

## 2024-04-08 RX ORDER — SERTRALINE HYDROCHLORIDE 50 MG/1
50 TABLET, FILM COATED ORAL NIGHTLY
Qty: 90 TABLET | Refills: 1 | Status: SHIPPED | OUTPATIENT
Start: 2024-04-08

## 2024-04-08 NOTE — PROGRESS NOTES
"Subjective:       Patient ID: Latasha Montes is a 64 y.o. female.    Chief Complaint: Annual Exam    64-year-old female patient with Patient Active Problem List:     EARL (generalized anxiety disorder)     Tinnitus     Dysphonia     Palmar fascial fibromatosis (dupuytren)     Chronic laryngitis     Osteopenia     Hyperlipidemia     EKG abnormalities  Here for routine annual physicals  Patient reports that she has a service dog currently to help her with anxiety and has been doing well with taking Zoloft 50 mg daily, requesting refill today  Denies of any sinus issues or pressure lately  Staying physically active with diet and exercise      Review of Systems   Constitutional:  Negative for fatigue.   HENT:  Negative for sinus pressure and sinus pain.    Eyes:  Negative for visual disturbance.   Respiratory:  Negative for shortness of breath.    Cardiovascular:  Negative for chest pain and leg swelling.   Gastrointestinal:  Negative for abdominal pain, nausea and vomiting.   Musculoskeletal:  Negative for myalgias.   Skin:  Negative for rash.   Neurological:  Negative for light-headedness and headaches.   Psychiatric/Behavioral:  Negative for dysphoric mood and sleep disturbance. The patient is nervous/anxious.          /70 (BP Location: Left arm, Patient Position: Sitting, BP Method: Large (Manual))   Pulse 86   Temp 97 °F (36.1 °C) (Tympanic)   Ht 5' 9.02" (1.753 m)   Wt 70.4 kg (155 lb 3.3 oz)   SpO2 98%   BMI 22.91 kg/m²   Objective:      Physical Exam  Constitutional:       Appearance: She is well-developed.   HENT:      Head: Normocephalic and atraumatic.   Cardiovascular:      Rate and Rhythm: Normal rate and regular rhythm.      Heart sounds: Normal heart sounds. No murmur heard.  Pulmonary:      Effort: Pulmonary effort is normal.      Breath sounds: Normal breath sounds. No wheezing.   Abdominal:      General: Bowel sounds are normal.      Palpations: Abdomen is soft.      Tenderness: There is " no abdominal tenderness.   Skin:     General: Skin is warm and dry.      Findings: No rash.   Neurological:      Mental Status: She is alert and oriented to person, place, and time.   Psychiatric:         Mood and Affect: Mood normal.           Assessment/Plan:   1. Routine general medical examination at a health care facility  -     Urinalysis, Reflex to Urine Culture Urine, Clean Catch; Future; Expected date: 04/08/2024  -     Hemoglobin A1C; Future; Expected date: 04/08/2024  -     TSH; Future; Expected date: 04/08/2024  -     Lipid Panel; Future; Expected date: 04/08/2024  -     Comprehensive Metabolic Panel; Future; Expected date: 04/08/2024  -     CBC Auto Differential; Future; Expected date: 04/08/2024  Vital signs stable today.  Clinical exam stable  Continue lifestyle modifications with low-fat and low-cholesterol diet and exercise 30 minutes daily      2. EARL (generalized anxiety disorder)  -     sertraline (ZOLOFT) 50 MG tablet; Take 1 tablet (50 mg total) by mouth every evening.  Dispense: 90 tablet; Refill: 1  Stable on Zoloft 50 mg daily    3. Mixed hyperlipidemia  -     Lipid Panel; Future; Expected date: 04/08/2024  Diet controlled    4. Osteopenia, unspecified location  Continue calcium with vitamin-D supplements over-the-counter    5. Chronic laryngitis  Stable

## 2024-04-08 NOTE — PROGRESS NOTES
CC: Well woman exam    Latasha Montes is a 64 y.o. female  presents for well woman exam.  LMP: No LMP recorded. Patient is postmenopausal..    Mmg scheduled May  Pap in  normal HPV negative  Dexa showed some osteopenia in  - pt taking daily calcium and Vitamin D as well as walking daily    Past Medical History:   Diagnosis Date    Encephalitis      Past Surgical History:   Procedure Laterality Date    AUGMENTATION OF BREAST  2005    HAND SURGERY Right 2022    for dupuytren contracture; Dr. Everett Garcia    REPAIR OF LYMPHOCELE  2021    TONGUE BIOPSY  2021     Social History     Socioeconomic History    Marital status:    Tobacco Use    Smoking status: Former     Current packs/day: 0.00     Average packs/day: 0.1 packs/day for 1 year (0.1 ttl pk-yrs)     Types: Cigarettes     Start date:      Quit date:      Years since quittin.2    Smokeless tobacco: Former    Tobacco comments:     1 pack per week for 6 months, quit in    Substance and Sexual Activity    Alcohol use: No    Drug use: Never    Sexual activity: Not Currently     Social Determinants of Health     Financial Resource Strain: Low Risk  (2023)    Overall Financial Resource Strain (CARDIA)     Difficulty of Paying Living Expenses: Not hard at all   Food Insecurity: No Food Insecurity (2023)    Hunger Vital Sign     Worried About Running Out of Food in the Last Year: Never true     Ran Out of Food in the Last Year: Never true   Transportation Needs: No Transportation Needs (2023)    PRAPARE - Transportation     Lack of Transportation (Medical): No     Lack of Transportation (Non-Medical): No   Physical Activity: Sufficiently Active (2023)    Exercise Vital Sign     Days of Exercise per Week: 5 days     Minutes of Exercise per Session: 40 min   Stress: No Stress Concern Present (2023)    [employer] - Occupational Stress Questionnaire     Feeling of Stress  ": Not at all   Social Connections: Unknown (2023)    Social Connection and Isolation Panel [NHANES]     Frequency of Communication with Friends and Family: Twice a week     Attends Temple Services: More than 4 times per year     Active Member of Clubs or Organizations: No     Attends Club or Organization Meetings: Never     Marital Status:    Housing Stability: Low Risk  (2023)    Housing Stability Vital Sign     Unable to Pay for Housing in the Last Year: No     Number of Places Lived in the Last Year: 1     Unstable Housing in the Last Year: No     Family History   Problem Relation Age of Onset    Hypertension Mother      OB History          0    Para   0    Term   0       0    AB   0    Living   0         SAB   0    IAB   0    Ectopic   0    Multiple   0    Live Births   0                 /65 (BP Location: Left forearm, Patient Position: Sitting)   Ht 5' 9" (1.753 m)   Wt 70.4 kg (155 lb 3.3 oz)   BMI 22.92 kg/m²       ROS:  Per hpi    PHYSICAL EXAM:  APPEARANCE: Well nourished, well developed, in no acute distress.  AFFECT: WNL, alert and oriented x 3  SKIN: No acne or hirsutism  NECK: Neck symmetric without masses or thyromegaly  NODES: No inguinal, cervical, axillary, or femoral lymph node enlargement  CHEST: Good respiratory effect  ABDOMEN: Soft.  No tenderness or masses.  No hepatosplenomegaly.  No hernias.  BREASTS: Symmetrical, no skin changes or visible lesions.  No palpable masses, nipple discharge bilaterally.  PELVIC: Normal external genitalia without lesions.  Normal hair distribution.  Adequate perineal body, normal urethral meatus.  Vagina atrophic without lesions or discharge.  Cervix pink, without lesions, discharge or tenderness.  No significant cystocele or rectocele.  Bimanual exam shows uterus to be normal size, regular, mobile and nontender.  Adnexa without masses or tenderness.    EXTREMITIES: No edema.  Physical Exam    1. Encounter for " gynecological examination without abnormal finding        2. Postmenopausal  DXA Bone Density Axial Skeleton 1 or more sites      3. Osteopenia, unspecified location  DXA Bone Density Axial Skeleton 1 or more sites       AND PLAN:    Latasha was seen today for annual exam.    Diagnoses and all orders for this visit:    Encounter for gynecological examination without abnormal finding    Postmenopausal  -     DXA Bone Density Axial Skeleton 1 or more sites; Future    Osteopenia, unspecified location  -     DXA Bone Density Axial Skeleton 1 or more sites; Future         Patient was counseled today on A.C.S. Pap guidelines and recommendations for yearly pelvic exams, mammograms and monthly self breast exams; to see her PCP for other health maintenance.

## 2024-07-18 ENCOUNTER — HOSPITAL ENCOUNTER (OUTPATIENT)
Dept: RADIOLOGY | Facility: HOSPITAL | Age: 64
Discharge: HOME | End: 2024-07-18
Attending: FAMILY MEDICINE
Payer: MEDICARE

## 2024-07-18 VITALS — BODY MASS INDEX: 22.98 KG/M2 | WEIGHT: 155.19 LBS | HEIGHT: 69 IN

## 2024-07-18 DIAGNOSIS — Z12.31 SCREENING MAMMOGRAM FOR HIGH-RISK PATIENT: ICD-10-CM

## 2024-07-18 PROCEDURE — 77063 BREAST TOMOSYNTHESIS BI: CPT | Mod: TC

## 2024-07-18 PROCEDURE — 77067 SCR MAMMO BI INCL CAD: CPT | Mod: 26,,, | Performed by: RADIOLOGY

## 2024-07-18 PROCEDURE — 77063 BREAST TOMOSYNTHESIS BI: CPT | Mod: 26,,, | Performed by: RADIOLOGY

## 2024-07-24 ENCOUNTER — TELEPHONE (OUTPATIENT)
Dept: OBSTETRICS AND GYNECOLOGY | Facility: CLINIC | Age: 64
End: 2024-07-24
Payer: MEDICARE

## 2024-07-24 DIAGNOSIS — M85.80 OSTEOPENIA, UNSPECIFIED LOCATION: Primary | ICD-10-CM

## 2024-07-24 DIAGNOSIS — Z78.0 POSTMENOPAUSAL: ICD-10-CM

## 2024-07-24 NOTE — TELEPHONE ENCOUNTER
----- Message from Amaya Mckenzie NP sent at 7/24/2024  3:25 PM CDT -----  DEXA remains showing osteopenia and slight worsening since last DEXA  Make sure she is taking calcium 1200 mg a day with 800 IU of vitamin D  Also needs to make sure doing weight bearing exercises  Will refer on to rheumatology to see if she needs medication intervention

## 2024-07-24 NOTE — TELEPHONE ENCOUNTER
Message from Amaya Mckenzie NP sent at 7/24/2024  3:25 PM CDT -----  DEXA remains showing osteopenia and slight worsening since last DEXA  Make sure she is taking calcium 1200 mg a day with 800 IU of vitamin D  Also needs to make sure doing weight bearing exercises  Will refer on to rheumatology to see if she needs medication intervention     Mammo results:  Impression:   No mammographic evidence of malignancy.     BI-RADS Category 1: Negative     Recommendation:  Routine screening mammogram in 1 year is recommended.    Called patient and after verifying with 2 identifiers the above results discussed and patient will up her dosage of calcium and make appointment with rheum when contacted.

## 2024-08-28 ENCOUNTER — TELEPHONE (OUTPATIENT)
Dept: INTERNAL MEDICINE | Facility: CLINIC | Age: 64
End: 2024-08-28
Payer: MEDICARE

## 2024-08-28 ENCOUNTER — OFFICE VISIT (OUTPATIENT)
Dept: INTERNAL MEDICINE | Facility: CLINIC | Age: 64
End: 2024-08-28
Payer: MEDICARE

## 2024-08-28 ENCOUNTER — HOSPITAL ENCOUNTER (OUTPATIENT)
Dept: RADIOLOGY | Facility: HOSPITAL | Age: 64
Discharge: HOME | End: 2024-08-28
Attending: NURSE PRACTITIONER
Payer: MEDICARE

## 2024-08-28 VITALS
DIASTOLIC BLOOD PRESSURE: 78 MMHG | HEIGHT: 69 IN | SYSTOLIC BLOOD PRESSURE: 146 MMHG | WEIGHT: 153 LBS | OXYGEN SATURATION: 96 % | BODY MASS INDEX: 22.66 KG/M2 | HEART RATE: 111 BPM | TEMPERATURE: 100 F

## 2024-08-28 DIAGNOSIS — R19.5 ABNORMAL STOOL COLOR: ICD-10-CM

## 2024-08-28 DIAGNOSIS — R05.1 ACUTE COUGH: ICD-10-CM

## 2024-08-28 DIAGNOSIS — J18.9 PNEUMONIA OF RIGHT MIDDLE LOBE DUE TO INFECTIOUS ORGANISM: Primary | ICD-10-CM

## 2024-08-28 DIAGNOSIS — R50.9 FEVER, UNSPECIFIED FEVER CAUSE: ICD-10-CM

## 2024-08-28 DIAGNOSIS — R52 GENERALIZED BODY ACHES: ICD-10-CM

## 2024-08-28 DIAGNOSIS — R06.2 WHEEZING: ICD-10-CM

## 2024-08-28 DIAGNOSIS — R19.7 DIARRHEA, UNSPECIFIED TYPE: ICD-10-CM

## 2024-08-28 DIAGNOSIS — R49.0 HOARSENESS: ICD-10-CM

## 2024-08-28 DIAGNOSIS — R61 DIAPHORESIS: ICD-10-CM

## 2024-08-28 PROCEDURE — 3288F FALL RISK ASSESSMENT DOCD: CPT | Mod: CPTII,S$GLB,, | Performed by: NURSE PRACTITIONER

## 2024-08-28 PROCEDURE — 99214 OFFICE O/P EST MOD 30 MIN: CPT | Mod: 25,S$GLB,, | Performed by: NURSE PRACTITIONER

## 2024-08-28 PROCEDURE — 1159F MED LIST DOCD IN RCRD: CPT | Mod: CPTII,S$GLB,, | Performed by: NURSE PRACTITIONER

## 2024-08-28 PROCEDURE — 3078F DIAST BP <80 MM HG: CPT | Mod: CPTII,S$GLB,, | Performed by: NURSE PRACTITIONER

## 2024-08-28 PROCEDURE — 99999 PR PBB SHADOW E&M-EST. PATIENT-LVL V: CPT | Mod: PBBFAC,,, | Performed by: NURSE PRACTITIONER

## 2024-08-28 PROCEDURE — 3075F SYST BP GE 130 - 139MM HG: CPT | Mod: CPTII,S$GLB,, | Performed by: NURSE PRACTITIONER

## 2024-08-28 PROCEDURE — 94640 AIRWAY INHALATION TREATMENT: CPT | Mod: S$GLB,,, | Performed by: NURSE PRACTITIONER

## 2024-08-28 PROCEDURE — 3044F HG A1C LEVEL LT 7.0%: CPT | Mod: CPTII,S$GLB,, | Performed by: NURSE PRACTITIONER

## 2024-08-28 PROCEDURE — 1125F AMNT PAIN NOTED PAIN PRSNT: CPT | Mod: CPTII,S$GLB,, | Performed by: NURSE PRACTITIONER

## 2024-08-28 PROCEDURE — 3008F BODY MASS INDEX DOCD: CPT | Mod: CPTII,S$GLB,, | Performed by: NURSE PRACTITIONER

## 2024-08-28 PROCEDURE — 96372 THER/PROPH/DIAG INJ SC/IM: CPT | Mod: 59,S$GLB,, | Performed by: NURSE PRACTITIONER

## 2024-08-28 PROCEDURE — 1160F RVW MEDS BY RX/DR IN RCRD: CPT | Mod: CPTII,S$GLB,, | Performed by: NURSE PRACTITIONER

## 2024-08-28 PROCEDURE — 71046 X-RAY EXAM CHEST 2 VIEWS: CPT | Mod: 26,,, | Performed by: RADIOLOGY

## 2024-08-28 PROCEDURE — 1101F PT FALLS ASSESS-DOCD LE1/YR: CPT | Mod: CPTII,S$GLB,, | Performed by: NURSE PRACTITIONER

## 2024-08-28 PROCEDURE — 71046 X-RAY EXAM CHEST 2 VIEWS: CPT | Mod: TC

## 2024-08-28 RX ORDER — AZITHROMYCIN 250 MG/1
TABLET, FILM COATED ORAL
Qty: 6 TABLET | Refills: 0 | Status: SHIPPED | OUTPATIENT
Start: 2024-08-28 | End: 2024-09-02

## 2024-08-28 RX ORDER — CEFTRIAXONE 1 G/1
1 INJECTION, POWDER, FOR SOLUTION INTRAMUSCULAR; INTRAVENOUS
Status: COMPLETED | OUTPATIENT
Start: 2024-08-28 | End: 2024-08-28

## 2024-08-28 RX ORDER — ALBUTEROL SULFATE 90 UG/1
2 INHALANT RESPIRATORY (INHALATION) EVERY 6 HOURS PRN
Qty: 18 G | Refills: 0 | Status: SHIPPED | OUTPATIENT
Start: 2024-08-28

## 2024-08-28 RX ORDER — IPRATROPIUM BROMIDE AND ALBUTEROL SULFATE 2.5; .5 MG/3ML; MG/3ML
3 SOLUTION RESPIRATORY (INHALATION)
Status: COMPLETED | OUTPATIENT
Start: 2024-08-28 | End: 2024-08-28

## 2024-08-28 RX ORDER — AMOXICILLIN AND CLAVULANATE POTASSIUM 875; 125 MG/1; MG/1
1 TABLET, FILM COATED ORAL 2 TIMES DAILY
Qty: 14 TABLET | Refills: 0 | Status: SHIPPED | OUTPATIENT
Start: 2024-08-28 | End: 2024-09-04

## 2024-08-28 RX ORDER — BENZONATATE 200 MG/1
200 CAPSULE ORAL 3 TIMES DAILY PRN
Qty: 30 CAPSULE | Refills: 0 | Status: SHIPPED | OUTPATIENT
Start: 2024-08-28 | End: 2024-09-07

## 2024-08-28 RX ADMIN — CEFTRIAXONE 1 G: 1 INJECTION, POWDER, FOR SOLUTION INTRAMUSCULAR; INTRAVENOUS at 05:08

## 2024-08-28 RX ADMIN — IPRATROPIUM BROMIDE AND ALBUTEROL SULFATE 3 ML: 2.5; .5 SOLUTION RESPIRATORY (INHALATION) at 05:08

## 2024-08-28 NOTE — TELEPHONE ENCOUNTER
----- Message from Kemardonya Willis sent at 8/28/2024 10:32 AM CDT -----  Type:  Same Day Appointment Request    Caller is requesting a same day appointment.  Caller declined first available appointment listed below.    Name of Caller:SAMANTHA LEAL [[id number]]  When is the first available appointment?Nov 26  Symptoms:Weak, Coughing, Fever and chills  Best Call Back Number: [phone]  Additional Information: Oriana Regalado
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
## 2024-08-28 NOTE — TELEPHONE ENCOUNTER
Called patient to see where she was and patient stated at [facility] waiting to pick her medication up (that I'd previously sent).  Discussed Pneumonia diagnosis with patient.  Asked patient to return for antibiotic injection and breathing treatment.  Patient stated okay.

## 2024-08-28 NOTE — TELEPHONE ENCOUNTER
Called patient to see where she was.  Patient stated still at [facility]. Advise patient to return to clinic as soon as possible to steroid injection and breathing treatment.  Patient reported [facility] still has not filled her medicine.  Discussed with patient she can return to [facility] to pick prescription up after getting injection.  Patient stated okay verbalizing understanding

## 2024-08-28 NOTE — PATIENT INSTRUCTIONS
Out of bed as much as possible  Tylenol 1000 mg every 6 hours as needed for pain or body aches  Increase hydration with water/electrolytes  Obtain a pulse oximeter to monitor your oxygenation.  If your oxygenations falls at or below 90%, call 911  Dial 911 for any shortness of breath, difficulty breathing, intractable fever, or chest pain.     Brat diet as discussed if your diarrhea returns  - Banana  - Rice   - Applesauce  - Today      - Use albuterol inhaler every 4 hours while awake for at least one week or until symptoms resolve; whichever is first    - Maintain hydration over the next week     -Monitor oxygenation; if < 90%, call 911 to be transported to the ED; do not try to drive yourself    - Read AVS in its entirety    - Complete all of the antibiotics even if you start to feel better  Strict ER precautions given for fever, body aches, increase chills, nausea with vomiting, chest or back pain that may occur within 3 days of taking antibiotics or afterwards    - Follow up with PCP in 1 week    - If symptoms persists or worsen, RTC, follow up with PCP, or go to the nearest ER

## 2024-08-29 ENCOUNTER — TELEPHONE (OUTPATIENT)
Dept: INTERNAL MEDICINE | Facility: CLINIC | Age: 64
End: 2024-08-29
Payer: MEDICARE

## 2024-08-29 NOTE — TELEPHONE ENCOUNTER
Called to check on patient to see how she is doing with her Pneumonia.  Patient reports she is doing fine.  Denies fever, chills, SOB, N/V.

## 2024-09-17 ENCOUNTER — OFFICE VISIT (OUTPATIENT)
Dept: INTERNAL MEDICINE | Facility: CLINIC | Age: 64
End: 2024-09-17
Payer: MEDICARE

## 2024-09-17 VITALS
WEIGHT: 152.75 LBS | HEART RATE: 94 BPM | DIASTOLIC BLOOD PRESSURE: 72 MMHG | TEMPERATURE: 98 F | SYSTOLIC BLOOD PRESSURE: 126 MMHG | OXYGEN SATURATION: 97 % | HEIGHT: 69 IN | BODY MASS INDEX: 22.63 KG/M2

## 2024-09-17 DIAGNOSIS — R05.1 ACUTE COUGH: ICD-10-CM

## 2024-09-17 DIAGNOSIS — J18.9 PNEUMONIA OF RIGHT MIDDLE LOBE DUE TO INFECTIOUS ORGANISM: Primary | ICD-10-CM

## 2024-09-17 PROBLEM — J69.0 ASPIRATION PNEUMONIA OF RIGHT MIDDLE LOBE: Status: ACTIVE | Noted: 2024-09-17

## 2024-09-17 PROCEDURE — 1126F AMNT PAIN NOTED NONE PRSNT: CPT | Mod: CPTII,S$GLB,, | Performed by: NURSE PRACTITIONER

## 2024-09-17 PROCEDURE — 3044F HG A1C LEVEL LT 7.0%: CPT | Mod: CPTII,S$GLB,, | Performed by: NURSE PRACTITIONER

## 2024-09-17 PROCEDURE — 3074F SYST BP LT 130 MM HG: CPT | Mod: CPTII,S$GLB,, | Performed by: NURSE PRACTITIONER

## 2024-09-17 PROCEDURE — 99999 PR PBB SHADOW E&M-EST. PATIENT-LVL IV: CPT | Mod: PBBFAC,,, | Performed by: NURSE PRACTITIONER

## 2024-09-17 PROCEDURE — 99213 OFFICE O/P EST LOW 20 MIN: CPT | Mod: S$GLB,,, | Performed by: NURSE PRACTITIONER

## 2024-09-17 PROCEDURE — G2211 COMPLEX E/M VISIT ADD ON: HCPCS | Mod: S$GLB,,, | Performed by: NURSE PRACTITIONER

## 2024-09-17 PROCEDURE — 3008F BODY MASS INDEX DOCD: CPT | Mod: CPTII,S$GLB,, | Performed by: NURSE PRACTITIONER

## 2024-09-17 PROCEDURE — 3078F DIAST BP <80 MM HG: CPT | Mod: CPTII,S$GLB,, | Performed by: NURSE PRACTITIONER

## 2024-09-17 PROCEDURE — 1160F RVW MEDS BY RX/DR IN RCRD: CPT | Mod: CPTII,S$GLB,, | Performed by: NURSE PRACTITIONER

## 2024-09-17 PROCEDURE — 3288F FALL RISK ASSESSMENT DOCD: CPT | Mod: CPTII,S$GLB,, | Performed by: NURSE PRACTITIONER

## 2024-09-17 PROCEDURE — 1159F MED LIST DOCD IN RCRD: CPT | Mod: CPTII,S$GLB,, | Performed by: NURSE PRACTITIONER

## 2024-09-17 PROCEDURE — 1101F PT FALLS ASSESS-DOCD LE1/YR: CPT | Mod: CPTII,S$GLB,, | Performed by: NURSE PRACTITIONER

## 2024-09-17 RX ORDER — METHYLPREDNISOLONE 4 MG/1
TABLET ORAL
Qty: 21 EACH | Refills: 0 | Status: SHIPPED | OUTPATIENT
Start: 2024-09-17 | End: 2024-10-08

## 2024-09-17 NOTE — PROGRESS NOTES
Subjective:      Patient ID: Latasha Montes is a 64 y.o. female.    Chief Complaint: Follow-up    History of Present Illness      64 year old female presents today for follow up of your Pneumonia.  Patient complains of still having a cough.  Patient denies any shortness of breath, chest pain or tenderness, difficulty breathing, fever, sweats, chills, nausea, or vomiting.         Patient Active Problem List   Diagnosis    EARL (generalized anxiety disorder)    Tinnitus    Dysphonia    Palmar fascial fibromatosis (dupuytren)    Chronic laryngitis    Osteopenia    Hyperlipidemia    EKG abnormalities    Aspiration pneumonia of right middle lobe         Current Outpatient Medications:     ascorbic acid, vitamin C, (VITAMIN C) 1000 MG tablet, Take 500 mg by mouth once daily., Disp: , Rfl:     calcium cit/Mgox/vit D3/B6/min (CALCIUM-MAG-VIT B6-D3-MINERALS ORAL), Take 1 tablet by mouth once daily., Disp: , Rfl:     cartilage/collagen/bor/hyalur (JOINT HEALTH ORAL), Take 1 tablet by mouth Daily., Disp: , Rfl:     cholecalciferol, vitamin D3, 125 mcg (5,000 unit) Tab, Take 5,000 Units by mouth once daily., Disp: , Rfl:     COLLAGEN-BIOTIN-ASCORBIC ACID ORAL, Take 1 tablet by mouth every other day., Disp: , Rfl:     cyanocobalamin, vitamin B-12, (VITAMIN B-12) 2,500 mcg Subl, Place 2,500 mcg under the tongue once daily., Disp: , Rfl:     GREEN TEA EXTRACT ORAL, Take 1 tablet by mouth once daily., Disp: , Rfl:     loratadine 10 mg Cap, Take 1 tablet by mouth once daily., Disp: , Rfl:     sertraline (ZOLOFT) 50 MG tablet, Take 1 tablet (50 mg total) by mouth every evening., Disp: 90 tablet, Rfl: 1    TURMERIC ORAL, Take 1 tablet by mouth once daily., Disp: , Rfl:     zinc gluconate 50 mg tablet, Take 50 mg by mouth once daily. Every other day, Disp: , Rfl:     albuterol (VENTOLIN HFA) 90 mcg/actuation inhaler, Inhale 2 puffs into the lungs every 6 (six) hours as needed for Wheezing or Shortness of Breath (coughing). Rescue  "(Patient not taking: Reported on 9/17/2024), Disp: 18 g, Rfl: 0    cinnamon bark (CINNAMON ORAL), Take 1 tablet by mouth Daily. (Patient not taking: Reported on 9/17/2024), Disp: , Rfl:     methylPREDNISolone (MEDROL DOSEPACK) 4 mg tablet, use as directed, Disp: 21 each, Rfl: 0    valACYclovir (VALTREX) 1000 MG tablet, Take 1 tablet (1,000 mg total) by mouth 2 (two) times daily. (Patient not taking: Reported on 9/17/2024), Disp: 60 tablet, Rfl: 1      Objective:   /72 (BP Location: Right arm, Patient Position: Sitting, BP Method: Medium (Manual))   Pulse 94   Temp 97.7 °F (36.5 °C) (Tympanic)   Ht 5' 9" (1.753 m)   Wt 69.3 kg (152 lb 12.5 oz)   SpO2 97%   BMI 22.56 kg/m²     Physical Exam         General: In no acute distress.  Head: Normocephalic. Non traumatic.  Eyes: PERRLA. EOMs full.    Ears: EACs clear. TMs normal.  Nose: Mucosa pink. Mucosa moist. No obstruction.  Throat: Clear. No exudates. No lesions.  Neck: Supple. No masses. No thyromegaly. No bruits.  Chest: Lungs clear. No rales. No rhonchi. No wheezes. Respirations even and unlabored  Heart: RRR. No murmurs. No rubs. No gallops.  Abdomen: Soft. No tenderness. No masses. BS normal.  Neuro: No focal deficits appreciated. Good muscle tone. Normal response to visual stimuli. Normal response to auditory stimuli.  Skin: Normal. No rashes. No lesions noted.  Vitals: Blood pressure is normal.           Assessment:     1. Pneumonia of right middle lobe due to infectious organism    2. Acute cough      Plan:   Assessment & Plan            PNEUMONIA OF RIGHT MIDDLE LOBE Follow up  - Stable  - Reports she completed all as prescribed    COUGH  - Advised is the last symptom to leave  - Will prescribe oral steroids to assist with cough    Follow up as needed or if symptoms persist/worsen        No follow-ups on file.    "

## 2024-10-10 ENCOUNTER — TELEPHONE (OUTPATIENT)
Dept: INTERNAL MEDICINE | Facility: CLINIC | Age: 64
End: 2024-10-10
Payer: MEDICARE

## 2024-10-10 NOTE — TELEPHONE ENCOUNTER
----- Message from Melony sent at 10/10/2024  3:21 PM CDT -----  Type:  Patient Requesting Referral    Who Called:pt  Does the patient already have the specialty appointment scheduled?:no  If yes, what is the date of that appointment?:no  Referral to What Specialty:radiology chest xray  Reason for Referral:cough  Does the patient want the referral with a specific physician?:na  [facility]  Patient Requesting a Response?:call  Would the patient rather a call back or a response via [facility]? call  Best Call Back Number:[phone]    Additional Information:requesting a call asap as she has appt tomorrow and is requesting a chest xray as she is following up from pneumonia and still has a dry lingering cough

## 2024-10-11 ENCOUNTER — HOSPITAL ENCOUNTER (OUTPATIENT)
Dept: RADIOLOGY | Facility: HOSPITAL | Age: 64
Discharge: HOME | End: 2024-10-11
Attending: FAMILY MEDICINE
Payer: MEDICARE

## 2024-10-11 ENCOUNTER — OFFICE VISIT (OUTPATIENT)
Dept: INTERNAL MEDICINE | Facility: CLINIC | Age: 64
End: 2024-10-11
Payer: MEDICARE

## 2024-10-11 ENCOUNTER — TELEPHONE (OUTPATIENT)
Dept: INTERNAL MEDICINE | Facility: CLINIC | Age: 64
End: 2024-10-11

## 2024-10-11 VITALS
HEART RATE: 73 BPM | DIASTOLIC BLOOD PRESSURE: 72 MMHG | SYSTOLIC BLOOD PRESSURE: 116 MMHG | BODY MASS INDEX: 22.4 KG/M2 | OXYGEN SATURATION: 97 % | TEMPERATURE: 98 F | WEIGHT: 151.69 LBS

## 2024-10-11 DIAGNOSIS — M85.80 OSTEOPENIA, UNSPECIFIED LOCATION: ICD-10-CM

## 2024-10-11 DIAGNOSIS — R05.2 SUBACUTE COUGH: Primary | ICD-10-CM

## 2024-10-11 DIAGNOSIS — E78.2 MIXED HYPERLIPIDEMIA: ICD-10-CM

## 2024-10-11 DIAGNOSIS — R05.2 SUBACUTE COUGH: ICD-10-CM

## 2024-10-11 DIAGNOSIS — J44.9 CHRONIC OBSTRUCTIVE PULMONARY DISEASE, UNSPECIFIED COPD TYPE: ICD-10-CM

## 2024-10-11 DIAGNOSIS — F41.1 GAD (GENERALIZED ANXIETY DISORDER): ICD-10-CM

## 2024-10-11 PROCEDURE — 71046 X-RAY EXAM CHEST 2 VIEWS: CPT | Mod: TC

## 2024-10-11 PROCEDURE — 99999 PR PBB SHADOW E&M-EST. PATIENT-LVL IV: CPT | Mod: PBBFAC,,, | Performed by: FAMILY MEDICINE

## 2024-10-11 PROCEDURE — 71046 X-RAY EXAM CHEST 2 VIEWS: CPT | Mod: 26,,, | Performed by: RADIOLOGY

## 2024-10-11 NOTE — TELEPHONE ENCOUNTER
Spoke with pt via telephone, advised of Dr. Regalado recommendations, pt verbalized understanding. /LF

## 2024-10-11 NOTE — PROGRESS NOTES
Subjective:       Patient ID: Latasha Montes is a 64 y.o. female presents with   Patient Active Problem List   Diagnosis    EARL (generalized anxiety disorder)    Tinnitus    Dysphonia    Palmar fascial fibromatosis (dupuytren)    Chronic laryngitis    Osteopenia    Hyperlipidemia    EKG abnormalities    Aspiration pneumonia of right middle lobe        Chief Complaint: Cough (Pt states she had pneumonia a few weeks back and still has a dry persistent cough. Afebrile. No other concerns voiced at this time. )    History of Present Illness    Latasha presents today for follow-up of persistent cough and hoarseness after pneumonia diagnosis. She was diagnosed with pneumonia in August, confirmed by chest XR on August 28th. She completed a course of antibiotics prescribed by a nurse practitioner. Despite treatment, she reports persistent hoarseness and a dry cough with a tickle sensation in the esophagus. She denies productive cough but notes changes in her voice. She has completed courses of antibiotics and oral steroids, and inhaler, but symptoms remain unresolved. 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 She also reports possible aspiration of a gummy bear and popcorn while talking on the phone. She reports her grandfather had lung cancer, expressing heightened concern about her own respiratory health due to this family history.      ROS:  General: -fever, -chills, -fatigue, -weight gain, -weight loss, -loss of appetite  Eyes: -vision changes, -blurry vision, -eye pain, -eye discharge  ENT: -ear pain, -hearing loss, -tinnitus, -nasal congestion, -sore throat, + voice change  Cardiovascular: -chest pain, -palpitations, -lower extremity edema  Respiratory: +cough, -shortness of breath, -wheezing, -sputum production  Endocrine: -polyuria, -polydipsia, -heat intolerance, -cold  intolerance  Gastrointestinal: -abdominal pain, -heartburn, -nausea, -vomiting, -diarrhea, -constipation, -blood in stool  Genitourinary: -dysuria, -urgency, -frequency, -hematuria, -nocturia, -incontinence  Heme & Lymphatic: -easy or excessive bleeding, -easy bruising, -swollen lymph nodes  Musculoskeletal: -muscle pain, -back pain, -joint pain, -joint swelling  Skin: -rash, -lesion, -itching, -skin texture changes, -skin color changes  Neurological: -headache, -dizziness, -numbness, -tingling, -seizure activity, -speech difficulty, -memory loss, -confusion  Psychiatric: -anxiety, -depression, -sleep difficulty                /72   Pulse 73   Temp 98.2 °F (36.8 °C) (Oral)   Wt 68.8 kg (151 lb 10.8 oz)   SpO2 97%   BMI 22.40 kg/m²   Objective:      Physical Exam  Constitutional:       Appearance: She is well-developed.   HENT:      Head: Normocephalic and atraumatic.      Nose: No congestion or rhinorrhea.      Mouth/Throat:      Mouth: Mucous membranes are moist.      Pharynx: No oropharyngeal exudate or posterior oropharyngeal erythema.   Cardiovascular:      Rate and Rhythm: Normal rate and regular rhythm.      Heart sounds: Normal heart sounds. No murmur heard.  Pulmonary:      Effort: Pulmonary effort is normal.      Breath sounds: Normal breath sounds. No wheezing.   Abdominal:      General: Bowel sounds are normal.      Palpations: Abdomen is soft.      Tenderness: There is no abdominal tenderness.   Lymphadenopathy:      Cervical: No cervical adenopathy.   Skin:     General: Skin is warm and dry.      Findings: No rash.   Neurological:      Mental Status: She is alert and oriented to person, place, and time.   Psychiatric:         Mood and Affect: Mood normal.           Assessment/Plan:   1. Subacute cough  -     CBC Auto Differential; Future; Expected date: 10/11/2024  -     X-Ray Chest PA And Lateral; Future; Expected date: 10/11/2024  Patient continues to have persistent cough, would like to  consider taking natural remedies like honey and not interested in taking any cough medication  Reports no response with Tessalon Perles and not interested in taking prescription cough medication  Finish steroids and azithromycin recently  Will check further labs and chest x-ray  If symptoms continue to persist patient may benefit from trying over-the-counter Prilosec for 2 weeks, patient has been taking Claritin for the past 2 weeks with no relief  Encouraged to drink adequate fluids    2. EARL (generalized anxiety disorder)  -     Basic Metabolic Panel; Future; Expected date: 10/11/2024  Stable on sertraline 50 mg daily    3. Osteopenia, unspecified location  Continue calcium with vitamin-D supplements over-the-counter    4. Mixed hyperlipidemia  -     Lipid Panel; Future; Expected date: 10/11/2024  Diet controlled    Visit today included increased complexity associated with the care of the episodic problem  addressed and managing the longitudinal care of the patient due to the serious and/or complex managed problem(s) .          This note was generated with the assistance of ambient listening technology. Verbal consent was obtained by the patient and accompanying visitor(s) for the recording of patient appointment to facilitate this note. I attest to having reviewed and edited the generated note for accuracy, though some syntax or spelling errors may persist. Please contact the author of this note for any clarification.

## 2024-10-11 NOTE — TELEPHONE ENCOUNTER
Chest x-ray did show any sign of pneumonia but noted COPD changes, encouraged to use albuterol inhaler as needed for chronic cough and will refer to pulmonology for further evaluation

## 2024-10-14 ENCOUNTER — TELEPHONE (OUTPATIENT)
Dept: INTERNAL MEDICINE | Facility: CLINIC | Age: 64
End: 2024-10-14
Payer: MEDICARE

## 2024-10-14 NOTE — TELEPHONE ENCOUNTER
----- Message from Tia sent at 10/14/2024  9:28 AM CDT -----  Who Called: Pt    What is the request in detail: Requesting call back to discuss lab results. Pt needs clarity on pulmonary discussion.  Please advise    Can the clinic reply by [facility]? No    Best Call Back Number: [phone]      Additional Information:

## 2024-10-15 ENCOUNTER — TELEPHONE (OUTPATIENT)
Dept: INTERNAL MEDICINE | Facility: CLINIC | Age: 64
End: 2024-10-15
Payer: MEDICARE

## 2024-10-15 NOTE — TELEPHONE ENCOUNTER
..Spoke with patient regarding lab results. Patient voiced no further questions or concerns./minor

## 2024-10-15 NOTE — TELEPHONE ENCOUNTER
----- Message from Nikole sent at 10/15/2024  4:23 PM CDT -----  Name of Who is Calling:   SAMANTHA LEAL [[id number]]     What is the request in detail: Pt will like  to know did her labs come back and if so she will like to know her results please advise         Can the clinic reply by [facility]:call back         What [facility]: Telephone Information:  Mobile          [phone]

## 2024-10-17 ENCOUNTER — OFFICE VISIT (OUTPATIENT)
Dept: PULMONOLOGY | Facility: CLINIC | Age: 64
End: 2024-10-17
Payer: MEDICARE

## 2024-10-17 VITALS
OXYGEN SATURATION: 99 % | WEIGHT: 152.56 LBS | RESPIRATION RATE: 18 BRPM | HEART RATE: 75 BPM | SYSTOLIC BLOOD PRESSURE: 120 MMHG | DIASTOLIC BLOOD PRESSURE: 72 MMHG | BODY MASS INDEX: 22.6 KG/M2 | HEIGHT: 69 IN

## 2024-10-17 DIAGNOSIS — J44.9 CHRONIC OBSTRUCTIVE PULMONARY DISEASE, UNSPECIFIED COPD TYPE: ICD-10-CM

## 2024-10-17 DIAGNOSIS — J20.8 ACUTE BRONCHITIS, VIRAL: Primary | ICD-10-CM

## 2024-10-17 DIAGNOSIS — R05.2 SUBACUTE COUGH: ICD-10-CM

## 2024-10-17 PROCEDURE — 99999 PR PBB SHADOW E&M-EST. PATIENT-LVL V: CPT | Mod: PBBFAC,,, | Performed by: STUDENT IN AN ORGANIZED HEALTH CARE EDUCATION/TRAINING PROGRAM

## 2024-10-17 RX ORDER — FLUTICASONE PROPIONATE 50 MCG
1 SPRAY, SUSPENSION (ML) NASAL 2 TIMES DAILY
Qty: 1 G | Refills: 3 | Status: SHIPPED | OUTPATIENT
Start: 2024-10-17

## 2024-10-17 RX ORDER — PREDNISONE 20 MG/1
20 TABLET ORAL DAILY
Qty: 7 TABLET | Refills: 0 | Status: SHIPPED | OUTPATIENT
Start: 2024-10-17 | End: 2024-10-24

## 2024-10-17 RX ORDER — PANTOPRAZOLE SODIUM 40 MG/1
40 TABLET, DELAYED RELEASE ORAL DAILY
Qty: 90 TABLET | Refills: 3 | Status: SHIPPED | OUTPATIENT
Start: 2024-10-17 | End: 2025-10-17

## 2024-10-17 RX ORDER — FLUTICASONE FUROATE AND VILANTEROL 200; 25 UG/1; UG/1
1 POWDER RESPIRATORY (INHALATION) DAILY
Qty: 1 EACH | Refills: 2 | Status: SHIPPED | OUTPATIENT
Start: 2024-10-17

## 2024-10-17 NOTE — PROGRESS NOTES
Chief complaint:  Chief Complaint   Patient presents with    COPD    Cough        History of present illness -  Latasha comes to clinic for residual cough.    Her problems started back in mid August where she started feeling ill with what seems to be pneumonic symptoms for about 2 weeks she felt the symptoms and finally decided to seek medical attention, a chest x-ray on 2024 show findings consistent with pneumonia, and she was treated with Augmentin for 7 days with resolution of most of her symptoms but the cough remained.    The cough is mainly nonproductive but it can be productive at times with expectoration of yellow greenish sputum, it has no real predominance but definitely not worse during the night.  Nothing seems to improve it nothing seems to worsen it.    Patient did mentioned exposure to spray paint and potential noxious stimuli to the airway as well as potentially aspirating gummy bear and popcorn, however her repeat x-ray seems very reassuring.       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   Constitutional:       Appearance: Normal appearance.   HENT:      Head: Normocephalic and atraumatic.      Nose: Nose normal.      Mouth/Throat:      Mouth: Mucous membranes are moist.   Eyes:      Pupils: Pupils are equal, round, and reactive to light.   Cardiovascular:      Rate and Rhythm: Normal rate and regular rhythm.      Pulses: Normal pulses.      Heart sounds: Normal heart sounds.   Pulmonary:      Effort: Pulmonary effort is normal.      Breath sounds: Normal breath sounds.   Abdominal:      General: Abdomen is flat.      Palpations: Abdomen is soft.   Musculoskeletal:         General: No swelling.   Skin:     General: Skin is warm.       "Capillary Refill: Capillary refill takes less than 2 seconds.   Neurological:      General: No focal deficit present.      Mental Status: She is alert.        Vitals:    10/17/24 0810   BP: 120/72   Pulse: 75   Resp: 18   SpO2: 99%   Weight: 69.2 kg (152 lb 8.9 oz)   Height: 5' 9" (1.753 m)      Review of Systems   Constitutional: Negative.    HENT: Negative.     Eyes: Negative.    Respiratory:  Positive for cough.    Cardiovascular: Negative.    Gastrointestinal: Negative.    Musculoskeletal: Negative.    Skin: Negative.    Neurological: Negative.        Relevant data (Independently reviewed by me) -    Prior notes -   Primary care    Labs -   10/12/2024 CMP with bicarbonate in the upper limit of normal and CBC normal.    Spirometry -  Not available    Imaging -   10/11/2024 chest x-ray consistent with upper lobe predominant emphysema with decreased vascular markings in the upper lobes and some flattening of the diaphragms.  08/28/2024 right upper lobe discrete infiltrate.  12/26/2023 TTE was essentially normal.    Assessment & Plan    Acute bronchitis, viral    Subacute cough  -     Ambulatory referral/consult to Pulmonology    Chronic obstructive pulmonary disease, unspecified COPD type  -     Ambulatory referral/consult to Pulmonology  -     Spirometry with/without bronchodilator; Future; Expected date: 10/17/2024    Other orders  -     fluticasone propionate (FLONASE) 50 mcg/actuation nasal spray; 1 spray (50 mcg total) by Each Nostril route 2 (two) times a day.  Dispense: 1 g; Refill: 3  -     pantoprazole (PROTONIX) 40 MG tablet; Take 1 tablet (40 mg total) by mouth once daily.  Dispense: 90 tablet; Refill: 3  -     predniSONE (DELTASONE) 20 MG tablet; Take 1 tablet (20 mg total) by mouth once daily. for 7 days  Dispense: 7 tablet; Refill: 0  -     fluticasone furoate-vilanteroL (BREO ELLIPTA) 200-25 mcg/dose DsDv diskus inhaler; Inhale 1 puff into the lungs once daily. Controller  Dispense: 1 each; Refill: " 2    Residual cough in the setting of a recent infection it is very common, unfortunately it can be very bothersome to have residual symptoms but it can take up to 3 months for the lung inflammation to fully subside.    I will treat the patient aggressively for residual airway inflammation with ics Laba 1 puff daily Breo Ellipta.  Flonase 2 puffs twice a day as well as Protonix for potential acid reflux.  I will give the patient also a 7 day course of prednisone to expedite her recovery      Her x-ray shows findings consistent with emphysema in the setting of very little smoking exposure I am concerned about potential alpha 1 antitrypsin deficiency, we will start with spirometry to rule in obstruction if positive we will proceed with testing.    She will see me in about 2 months    Parth Perez   10/17/2024

## 2024-12-05 ENCOUNTER — NURSE TRIAGE (OUTPATIENT)
Dept: ADMINISTRATIVE | Facility: CLINIC | Age: 64
End: 2024-12-05
Payer: MEDICARE

## 2024-12-05 NOTE — TELEPHONE ENCOUNTER
[place] country [place]. Started having cough, body aches, fever while she was gone. Saw provider while gone, advised to f/u with PCP and possible need for CT. No fever now. Feels sob now, sob at rest and with very little activity/movement.     Dispo-ER now for eval. Pt is reluctant, feels ER is drastic & says she was advised to get CT. Reiterated importance of following advisement based on symptoms & questions answered; message would be routed to Dr. Regalado's clinic. Pt vu.     Reason for Disposition   MODERATE difficulty breathing (e.g., speaks in phrases, SOB even at rest, pulse 100-120) of new-onset or worse than normal    Additional Information   Negative: SEVERE difficulty breathing (e.g., struggling for each breath, speaks in single words, pulse > 120)   Negative: Breathing stopped and hasn't returned   Negative: Choking on something   Negative: Bluish (or gray) lips or face   Negative: Difficult to awaken or acting confused (e.g., disoriented, slurred speech)   Negative: Passed out (e.g., fainted, lost consciousness, blacked out and was not responding)   Negative: Wheezing started suddenly after medicine, an allergic food, or bee sting   Negative: Stridor (harsh sound while breathing in)   Negative: Slow, shallow and weak breathing   Negative: Sounds like a life-threatening emergency to the triager    Protocols used: Breathing Difficulty-A-OH

## 2024-12-12 DIAGNOSIS — F41.1 GAD (GENERALIZED ANXIETY DISORDER): ICD-10-CM

## 2024-12-12 RX ORDER — SERTRALINE HYDROCHLORIDE 50 MG/1
TABLET, FILM COATED ORAL
Qty: 90 TABLET | Refills: 3 | Status: SHIPPED | OUTPATIENT
Start: 2024-12-12

## 2024-12-12 NOTE — TELEPHONE ENCOUNTER
No care due was identified.  [facility] Embedded Care Due Messages. Reference number: [id number].   12/12/2024 7:49:00 AM CST

## 2024-12-12 NOTE — TELEPHONE ENCOUNTER
Refill Decision Note   Latasha Montes  is requesting a refill authorization.  Brief Assessment and Rationale for Refill:  Approve     Medication Therapy Plan:         Comments:     Note composed:8:29 AM 12/12/2024

## 2024-12-20 ENCOUNTER — OFFICE VISIT (OUTPATIENT)
Dept: PULMONOLOGY | Facility: CLINIC | Age: 64
End: 2024-12-20
Payer: MEDICARE

## 2024-12-20 ENCOUNTER — CLINICAL SUPPORT (OUTPATIENT)
Dept: PULMONOLOGY | Facility: CLINIC | Age: 64
End: 2024-12-20
Payer: MEDICARE

## 2024-12-20 VITALS
RESPIRATION RATE: 19 BRPM | HEIGHT: 69 IN | OXYGEN SATURATION: 98 % | WEIGHT: 152.88 LBS | BODY MASS INDEX: 22.64 KG/M2 | DIASTOLIC BLOOD PRESSURE: 68 MMHG | SYSTOLIC BLOOD PRESSURE: 126 MMHG | HEART RATE: 77 BPM

## 2024-12-20 DIAGNOSIS — J44.9 CHRONIC OBSTRUCTIVE PULMONARY DISEASE, UNSPECIFIED COPD TYPE: ICD-10-CM

## 2024-12-20 DIAGNOSIS — J44.9 OBSTRUCTIVE LUNG DISEASE: Primary | ICD-10-CM

## 2024-12-20 DIAGNOSIS — R05.1 ACUTE COUGH: ICD-10-CM

## 2024-12-20 DIAGNOSIS — J06.9 VIRAL URI WITH COUGH: ICD-10-CM

## 2024-12-20 LAB
BRPFT: ABNORMAL
FEF 25 75 CHG: 42.3 %
FEF 25 75 LLN: 1.47
FEF 25 75 POST REF: 38.6 %
FEF 25 75 PRE REF: 27.2 %
FEF 25 75 REF: 2.87
FET100 CHG: -15.3 %
FEV1 CHG: -1.1 %
FEV1 FVC CHG: 2.5 %
FEV1 FVC LLN: 65
FEV1 FVC POST REF: 83.1 %
FEV1 FVC PRE REF: 81.1 %
FEV1 FVC REF: 78
FEV1 LLN: 1.99
FEV1 POST REF: 74.9 %
FEV1 PRE REF: 75.8 %
FEV1 REF: 2.7
FVC CHG: -3.6 %
FVC LLN: 2.59
FVC POST REF: 89.3 %
FVC PRE REF: 92.6 %
FVC REF: 3.5
PEF CHG: 6.6 %
PEF LLN: 4.74
PEF POST REF: 85.7 %
PEF PRE REF: 80.4 %
PEF REF: 6.73
POST FEF 25 75: 1.11 L/S (ref 1.47–4.26)
POST FET 100: 12 SEC
POST FEV1 FVC: 64.74 % (ref 65.15–88.82)
POST FEV1: 2.02 L (ref 1.99–3.38)
POST FVC: 3.12 L (ref 2.59–4.46)
POST PEF: 5.76 L/S (ref 4.74–8.72)
PRE FEF 25 75: 0.78 L/S (ref 1.47–4.26)
PRE FET 100: 14.16 SEC
PRE FEV1 FVC: 63.16 % (ref 65.15–88.82)
PRE FEV1: 2.05 L (ref 1.99–3.38)
PRE FVC: 3.24 L (ref 2.59–4.46)
PRE PEF: 5.41 L/S (ref 4.74–8.72)

## 2024-12-20 PROCEDURE — 99999 PR PBB SHADOW E&M-EST. PATIENT-LVL IV: CPT | Mod: PBBFAC,,, | Performed by: STUDENT IN AN ORGANIZED HEALTH CARE EDUCATION/TRAINING PROGRAM

## 2024-12-20 RX ORDER — BUDESONIDE AND FORMOTEROL FUMARATE DIHYDRATE 160; 4.5 UG/1; UG/1
2 AEROSOL RESPIRATORY (INHALATION) EVERY 12 HOURS
Qty: 1 G | Refills: 0 | Status: SHIPPED | OUTPATIENT
Start: 2024-12-20 | End: 2025-12-20

## 2024-12-20 RX ORDER — PREDNISONE 20 MG/1
40 TABLET ORAL DAILY
Qty: 10 TABLET | Refills: 0 | Status: SHIPPED | OUTPATIENT
Start: 2024-12-20 | End: 2024-12-25

## 2024-12-20 RX ORDER — AZITHROMYCIN 250 MG/1
TABLET, FILM COATED ORAL
Qty: 6 TABLET | Refills: 0 | Status: SHIPPED | OUTPATIENT
Start: 2024-12-20 | End: 2024-12-25

## 2024-12-20 RX ORDER — IPRATROPIUM BROMIDE AND ALBUTEROL SULFATE 2.5; .5 MG/3ML; MG/3ML
3 SOLUTION RESPIRATORY (INHALATION) EVERY 6 HOURS PRN
Qty: 75 ML | Refills: 0 | Status: SHIPPED | OUTPATIENT
Start: 2024-12-20 | End: 2025-01-04

## 2024-12-20 NOTE — PROGRESS NOTES
Chief complaint:  Chief Complaint   Patient presents with    Cough        History of present illness -  MILTON Pagan is an established clinic patient whom I 1st saw in October of 2024 for what seemed to be residual postviral cough.  Unfortunately over the past year she has had at least 2-3 episodes of pneumonia/acute bronchitis that have required antibiotic therapy.  She had post viral cough and presented to the clinic for initial evaluation of that.  During that evaluation it was noted that the patient never had a formal diagnosis of COPD and we were interested in categorizing her disease.    Interval history   12/20/2024  Patient comes back to clinic today with yet another episode of postviral illness, she has raspy voice as well as residual cough; she likely caught a viral URI right before her [place] trip, that was otherwise uneventful, on [place] the [place] had to go to the hospital where she was told that she had a sinus infection and was given antibiotics for it.  She is better from her symptoms however still has some residual cough and throat discomfort.  This happened approximately 3 weeks ago    Classification:  Group A (Minimal symptoms mMRC <2; CAT <10)  Obstruction severity GOLD 2 (50% > FEV1 < 80%)    Physical examination -   Physical Exam  Vitals and nursing note reviewed.   Constitutional:       Appearance: Normal appearance.   HENT:      Head: Normocephalic and atraumatic.      Nose: Nose normal.      Mouth/Throat:      Mouth: Mucous membranes are moist.   Eyes:      Pupils: Pupils are equal, round, and reactive to light.   Cardiovascular:      Rate and Rhythm: Normal rate and regular rhythm.      Pulses: Normal pulses.      Heart sounds: Normal heart sounds.   Pulmonary:      Effort: Pulmonary effort is normal.      Breath sounds: Normal breath sounds.   Abdominal:      General: Abdomen is flat.      Palpations: Abdomen is soft.   Musculoskeletal:         General: No swelling.   Skin:      "General: Skin is warm.      Capillary Refill: Capillary refill takes less than 2 seconds.   Neurological:      General: No focal deficit present.      Mental Status: She is alert.        Vitals:    12/20/24 0930   BP: 126/68   Pulse: 77   Resp: 19   SpO2: 98%   Weight: 69.4 kg (152 lb 14.4 oz)   Height: 5' 9" (1.753 m)      Review of Systems   Constitutional: Negative.    HENT: Negative.     Eyes: Negative.    Respiratory: Negative.     Cardiovascular: Negative.    Gastrointestinal: Negative.    Musculoskeletal: Negative.    Skin: Negative.    Neurological: Negative.        Relevant data (Independently reviewed by me) -    Spirometry -  12/20/2024 spirometry consistent with mild obstructive physiology     Imaging -   10/11/2024 chest x-ray consistent with upper lobe predominant emphysema with decreased vascular markings in the upper lobes and some flattening of the diaphragms.  08/28/2024 right upper lobe discrete infiltrate.  12/26/2023 TTE was essentially normal.     Assessment & Plan    Obstructive lung disease    Acute cough    Viral URI with cough    Other orders  -     predniSONE (DELTASONE) 20 MG tablet; Take 2 tablets (40 mg total) by mouth once daily. for 5 days  Dispense: 10 tablet; Refill: 0  -     budesonide-formoterol 160-4.5 mcg (SYMBICORT) 160-4.5 mcg/actuation HFAA; Inhale 2 puffs into the lungs every 12 (twelve) hours. Controller  Dispense: 1 g; Refill: 0  -     albuterol-ipratropium (DUO-NEB) 2.5 mg-0.5 mg/3 mL nebulizer solution; Take 3 mLs by nebulization every 6 (six) hours as needed for Wheezing. Rescue  Dispense: 75 mL; Refill: 0  -     azithromycin (Z-CECILE) 250 MG tablet; Take 2 tablets by mouth on day 1; Take 1 tablet by mouth on days 2-5  Dispense: 6 tablet; Refill: 0    Patient had initial dramatic response to initial prednisone burst, PPI, nasal spray and Breo Ellipta.  However not fully convinced she had a history of asthma, spirometry consistent with obstructive lung disease with very " limited amount of smoking raises likelihood of alpha-1 antitrypsin deficiency which we will test for.    In the meantime we will represcribed a week's worth of antibiotics azithromycin and prednisone to deal with the postviral cough, also added DuoNebs q.6 hours as needed for discomfort.  She is to stop her PPI, nasal spray and Breo Ellipta we will prescribe the patient's Symbicort to be used twice a day.    Patient to return to clinic in about 6 months    Parth Perez   12/20/2024

## 2025-01-03 ENCOUNTER — TELEPHONE (OUTPATIENT)
Dept: PULMONOLOGY | Facility: CLINIC | Age: 65
End: 2025-01-03
Payer: MEDICARE

## 2025-01-03 NOTE — TELEPHONE ENCOUNTER
----- Message from Latrice sent at 1/3/2025 12:20 PM CST -----  Contact: self  [phone]  Type:  Test Results    Who Called: jennifer  Name of Test (Lab/Mammo/Etc): alpha 1 swab  Date of Test: 12/20  Ordering Provider: farhad  Where the test was performed: [facility] the [place]  Would the patient rather a call back or a response via [facility]? Call back   Best Call Back Number:  [phone]  Additional Information:

## 2025-01-07 ENCOUNTER — TELEPHONE (OUTPATIENT)
Dept: PULMONOLOGY | Facility: CLINIC | Age: 65
End: 2025-01-07
Payer: MEDICARE

## 2025-01-07 NOTE — TELEPHONE ENCOUNTER
Spoke with patient to expain that this test takes a few weeks to come back. That Dr. Perez will call her once he gets the results. Pt agreed/

## 2025-01-07 NOTE — TELEPHONE ENCOUNTER
----- Message from Cecelia sent at 1/7/2025  9:26 AM CST -----  Contact: Latasha  Type:  Test Results    Who Called:  Latasha  Name of Test (Lab/Mammo/Etc): Alpha 1 test/swab  Date of Test:  12/20/2024  Ordering Provider: Dr. Perez  Where the test was performed:  [place] [facility]  Would the patient rather a call back or a response via [facility]? Call back   Best Call Back Number:  Please call her at  [phone]  Additional Information:

## 2025-03-24 DIAGNOSIS — Z00.00 ENCOUNTER FOR MEDICARE ANNUAL WELLNESS EXAM: ICD-10-CM

## 2025-04-08 ENCOUNTER — OFFICE VISIT (OUTPATIENT)
Dept: INTERNAL MEDICINE | Facility: CLINIC | Age: 65
End: 2025-04-08
Payer: MEDICARE

## 2025-04-08 VITALS
WEIGHT: 150.38 LBS | OXYGEN SATURATION: 98 % | RESPIRATION RATE: 18 BRPM | SYSTOLIC BLOOD PRESSURE: 120 MMHG | HEIGHT: 69 IN | HEART RATE: 70 BPM | DIASTOLIC BLOOD PRESSURE: 74 MMHG | BODY MASS INDEX: 22.27 KG/M2

## 2025-04-08 DIAGNOSIS — J37.0 CHRONIC LARYNGITIS: ICD-10-CM

## 2025-04-08 DIAGNOSIS — Z00.00 ROUTINE GENERAL MEDICAL EXAMINATION AT A HEALTH CARE FACILITY: Primary | ICD-10-CM

## 2025-04-08 DIAGNOSIS — J44.9 OBSTRUCTIVE LUNG DISEASE: ICD-10-CM

## 2025-04-08 DIAGNOSIS — Z12.11 COLON CANCER SCREENING: ICD-10-CM

## 2025-04-08 DIAGNOSIS — Z79.899 OTHER LONG TERM (CURRENT) DRUG THERAPY: ICD-10-CM

## 2025-04-08 DIAGNOSIS — E78.2 MIXED HYPERLIPIDEMIA: ICD-10-CM

## 2025-04-08 DIAGNOSIS — F41.1 GAD (GENERALIZED ANXIETY DISORDER): ICD-10-CM

## 2025-04-08 DIAGNOSIS — Z12.31 BREAST CANCER SCREENING BY MAMMOGRAM: ICD-10-CM

## 2025-04-08 DIAGNOSIS — M85.80 OSTEOPENIA, UNSPECIFIED LOCATION: ICD-10-CM

## 2025-04-08 PROBLEM — R94.31 EKG ABNORMALITIES: Status: RESOLVED | Noted: 2023-12-26 | Resolved: 2025-04-08

## 2025-04-08 PROBLEM — R49.0 DYSPHONIA: Status: RESOLVED | Noted: 2022-10-13 | Resolved: 2025-04-08

## 2025-04-08 PROBLEM — J69.0 ASPIRATION PNEUMONIA OF RIGHT MIDDLE LOBE: Status: RESOLVED | Noted: 2024-09-17 | Resolved: 2025-04-08

## 2025-04-08 LAB
25(OH)D3+25(OH)D2 SERPL-MCNC: 66 NG/ML (ref 30–96)
ABSOLUTE EOSINOPHIL (OHS): 0.1 K/UL
ABSOLUTE MONOCYTE (OHS): 0.48 K/UL (ref 0.3–1)
ABSOLUTE NEUTROPHIL COUNT (OHS): 2.55 K/UL (ref 1.8–7.7)
ALBUMIN SERPL BCP-MCNC: 4 G/DL (ref 3.5–5.2)
ALP SERPL-CCNC: 60 UNIT/L (ref 40–150)
ALT SERPL W/O P-5'-P-CCNC: 18 UNIT/L (ref 10–44)
ANION GAP (OHS): 10 MMOL/L (ref 8–16)
AST SERPL-CCNC: 25 UNIT/L (ref 11–45)
BASOPHILS # BLD AUTO: 0.04 K/UL
BASOPHILS NFR BLD AUTO: 0.8 %
BILIRUB SERPL-MCNC: 0.6 MG/DL (ref 0.1–1)
BILIRUB UR QL STRIP.AUTO: NEGATIVE
BUN SERPL-MCNC: 14 MG/DL (ref 8–23)
CALCIUM SERPL-MCNC: 9.2 MG/DL (ref 8.7–10.5)
CHLORIDE SERPL-SCNC: 102 MMOL/L (ref 95–110)
CHOLEST SERPL-MCNC: 226 MG/DL (ref 120–199)
CHOLEST/HDLC SERPL: 2.8 {RATIO} (ref 2–5)
CLARITY UR: CLEAR
CO2 SERPL-SCNC: 26 MMOL/L (ref 23–29)
COLOR UR AUTO: YELLOW
CREAT SERPL-MCNC: 0.7 MG/DL (ref 0.5–1.4)
EAG (OHS): 105 MG/DL (ref 68–131)
ERYTHROCYTE [DISTWIDTH] IN BLOOD BY AUTOMATED COUNT: 11.9 % (ref 11.5–14.5)
GFR SERPLBLD CREATININE-BSD FMLA CKD-EPI: >60 ML/MIN/1.73/M2
GLUCOSE SERPL-MCNC: 82 MG/DL (ref 70–110)
GLUCOSE UR QL STRIP: NEGATIVE
HBA1C MFR BLD: 5.3 % (ref 4–5.6)
HCT VFR BLD AUTO: 43.6 % (ref 37–48.5)
HDLC SERPL-MCNC: 80 MG/DL (ref 40–75)
HDLC SERPL: 35.4 % (ref 20–50)
HGB BLD-MCNC: 14.3 GM/DL (ref 12–16)
HGB UR QL STRIP: NEGATIVE
IMM GRANULOCYTES # BLD AUTO: 0 K/UL (ref 0–0.04)
IMM GRANULOCYTES NFR BLD AUTO: 0 % (ref 0–0.5)
KETONES UR QL STRIP: NEGATIVE
LDLC SERPL CALC-MCNC: 127.6 MG/DL (ref 63–159)
LEUKOCYTE ESTERASE UR QL STRIP: NEGATIVE
LYMPHOCYTES # BLD AUTO: 1.85 K/UL (ref 1–4.8)
MCH RBC QN AUTO: 30 PG (ref 27–31)
MCHC RBC AUTO-ENTMCNC: 32.8 G/DL (ref 32–36)
MCV RBC AUTO: 92 FL (ref 82–98)
NITRITE UR QL STRIP: NEGATIVE
NONHDLC SERPL-MCNC: 146 MG/DL
NUCLEATED RBC (/100WBC) (OHS): 0 /100 WBC
PH UR STRIP: 8 [PH]
PLATELET # BLD AUTO: 266 K/UL (ref 150–450)
PMV BLD AUTO: 10.5 FL (ref 9.2–12.9)
POTASSIUM SERPL-SCNC: 4 MMOL/L (ref 3.5–5.1)
PROT SERPL-MCNC: 7.4 GM/DL (ref 6–8.4)
PROT UR QL STRIP: NEGATIVE
RBC # BLD AUTO: 4.76 M/UL (ref 4–5.4)
RELATIVE EOSINOPHIL (OHS): 2 %
RELATIVE LYMPHOCYTE (OHS): 36.9 % (ref 18–48)
RELATIVE MONOCYTE (OHS): 9.6 % (ref 4–15)
RELATIVE NEUTROPHIL (OHS): 50.7 % (ref 38–73)
SODIUM SERPL-SCNC: 138 MMOL/L (ref 136–145)
SP GR UR STRIP: 1.01
TRIGL SERPL-MCNC: 92 MG/DL (ref 30–150)
TSH SERPL-ACNC: 1.1 UIU/ML (ref 0.4–4)
WBC # BLD AUTO: 5.02 K/UL (ref 3.9–12.7)

## 2025-04-08 PROCEDURE — 82306 VITAMIN D 25 HYDROXY: CPT | Performed by: FAMILY MEDICINE

## 2025-04-08 PROCEDURE — 99999 PR PBB SHADOW E&M-EST. PATIENT-LVL IV: CPT | Mod: PBBFAC,,, | Performed by: FAMILY MEDICINE

## 2025-04-08 PROCEDURE — 3078F DIAST BP <80 MM HG: CPT | Mod: CPTII,S$GLB,, | Performed by: FAMILY MEDICINE

## 2025-04-08 PROCEDURE — 85025 COMPLETE CBC W/AUTO DIFF WBC: CPT | Performed by: FAMILY MEDICINE

## 2025-04-08 PROCEDURE — 36415 COLL VENOUS BLD VENIPUNCTURE: CPT | Performed by: FAMILY MEDICINE

## 2025-04-08 PROCEDURE — 82465 ASSAY BLD/SERUM CHOLESTEROL: CPT | Performed by: FAMILY MEDICINE

## 2025-04-08 PROCEDURE — 83036 HEMOGLOBIN GLYCOSYLATED A1C: CPT | Performed by: FAMILY MEDICINE

## 2025-04-08 PROCEDURE — 3288F FALL RISK ASSESSMENT DOCD: CPT | Mod: CPTII,S$GLB,, | Performed by: FAMILY MEDICINE

## 2025-04-08 PROCEDURE — 1159F MED LIST DOCD IN RCRD: CPT | Mod: CPTII,S$GLB,, | Performed by: FAMILY MEDICINE

## 2025-04-08 PROCEDURE — 99397 PER PM REEVAL EST PAT 65+ YR: CPT | Mod: S$GLB,,, | Performed by: FAMILY MEDICINE

## 2025-04-08 PROCEDURE — 3008F BODY MASS INDEX DOCD: CPT | Mod: CPTII,S$GLB,, | Performed by: FAMILY MEDICINE

## 2025-04-08 PROCEDURE — 81003 URINALYSIS AUTO W/O SCOPE: CPT | Performed by: FAMILY MEDICINE

## 2025-04-08 PROCEDURE — 80053 COMPREHEN METABOLIC PANEL: CPT | Performed by: FAMILY MEDICINE

## 2025-04-08 PROCEDURE — 1101F PT FALLS ASSESS-DOCD LE1/YR: CPT | Mod: CPTII,S$GLB,, | Performed by: FAMILY MEDICINE

## 2025-04-08 PROCEDURE — 3074F SYST BP LT 130 MM HG: CPT | Mod: CPTII,S$GLB,, | Performed by: FAMILY MEDICINE

## 2025-04-08 PROCEDURE — 84443 ASSAY THYROID STIM HORMONE: CPT | Performed by: FAMILY MEDICINE

## 2025-04-08 NOTE — PROGRESS NOTES
"Subjective:       Patient ID: Latasha Montes is a 65 y.o. female presents with Problem List[1]     Chief Complaint: Annual Exam    65-year-old female patient with Patient Active Problem List:     EARL (generalized anxiety disorder)     Tinnitus     Palmar fascial fibromatosis (dupuytren)     Chronic laryngitis     Osteopenia     Mixed hyperlipidemia     Obstructive lung disease  Here for routine annual physicals  Patient has been taking her medications regularly and denies of any chest tightness or difficulty breathing with palpitations  Has not been using her inhalers on a daily basis and reports that she did spirometry and showing obstructive disease but denies any shortness of breath or wheezing      Review of Systems   Constitutional:  Negative for fatigue.   Eyes:  Negative for visual disturbance.   Respiratory:  Negative for cough, chest tightness, shortness of breath and wheezing.    Cardiovascular:  Negative for chest pain and leg swelling.   Gastrointestinal:  Negative for abdominal pain, nausea and vomiting.   Musculoskeletal:  Negative for myalgias.   Skin:  Negative for rash.   Neurological:  Negative for light-headedness and headaches.   Psychiatric/Behavioral:  Negative for sleep disturbance.          /74 (BP Location: Left arm, Patient Position: Sitting)   Pulse 70   Resp 18   Ht 5' 9" (1.753 m)   Wt 68.2 kg (150 lb 5.7 oz)   SpO2 98%   BMI 22.20 kg/m²   Objective:      Physical Exam  Constitutional:       Appearance: She is well-developed.   HENT:      Head: Normocephalic and atraumatic.   Cardiovascular:      Rate and Rhythm: Normal rate and regular rhythm.      Heart sounds: Normal heart sounds. No murmur heard.  Pulmonary:      Effort: Pulmonary effort is normal. No respiratory distress.      Breath sounds: Normal breath sounds. No wheezing.   Abdominal:      General: Bowel sounds are normal.      Palpations: Abdomen is soft.      Tenderness: There is no abdominal tenderness. "   Skin:     General: Skin is warm and dry.      Findings: No rash.   Neurological:      Mental Status: She is alert and oriented to person, place, and time.   Psychiatric:         Mood and Affect: Mood normal.           Assessment/Plan:   1. Routine general medical examination at a health care facility  -     Urinalysis, Reflex to Urine Culture  -     Hemoglobin A1C  -     TSH  -     Lipid Panel  -     Comprehensive Metabolic Panel  -     CBC Auto Differential  -     Vitamin D  Vital signs stable today.  Clinical exam stable  Continue lifestyle modifications with low-fat and low-cholesterol diet and exercise 30 minutes daily    2. Mixed hyperlipidemia  -     Lipid Panel  Diet controlled    3. Chronic laryngitis  Stable    4. EARL (generalized anxiety disorder)  Clinically doing well on sertraline 50 mg daily    5. Osteopenia, unspecified location  -     Vitamin D  Continue vitamin-D supplements over-the-counter 5000 units daily    6. Obstructive lung disease  -     CBC Auto Differential  Patient clinically doing well and denies any respiratory symptoms at this time and has not been using her inhalers    7. Other long term (current) drug therapy  -     Hemoglobin A1C    8. Colon cancer screening  -     Cologuard Screening (Multitarget Stool DNA); Future; Expected date: 04/08/2025  Refuses colonoscopy , will place orders for Cologuard    9. Breast cancer screening by mammogram  -     Mammo Digital Screening Bilat w/ Dragan (XPD); Future; Expected date: 07/21/2025  Due for mammogram     Patient due for tetanus pneumonia shingles and RSV vaccination, encouraged to consider getting it done             [1]   Patient Active Problem List  Diagnosis    EARL (generalized anxiety disorder)    Tinnitus    Palmar fascial fibromatosis (dupuytren)    Chronic laryngitis    Osteopenia    Mixed hyperlipidemia    Obstructive lung disease

## 2025-04-10 ENCOUNTER — RESULTS FOLLOW-UP (OUTPATIENT)
Dept: INTERNAL MEDICINE | Facility: CLINIC | Age: 65
End: 2025-04-10
Payer: MEDICARE

## 2025-04-22 ENCOUNTER — OFFICE VISIT (OUTPATIENT)
Dept: INTERNAL MEDICINE | Facility: CLINIC | Age: 65
End: 2025-04-22
Payer: MEDICARE

## 2025-04-22 ENCOUNTER — HOSPITAL ENCOUNTER (OUTPATIENT)
Dept: RADIOLOGY | Facility: HOSPITAL | Age: 65
Discharge: HOME | End: 2025-04-22
Attending: FAMILY MEDICINE
Payer: MEDICARE

## 2025-04-22 VITALS
BODY MASS INDEX: 21.98 KG/M2 | OXYGEN SATURATION: 99 % | HEIGHT: 69 IN | SYSTOLIC BLOOD PRESSURE: 134 MMHG | DIASTOLIC BLOOD PRESSURE: 74 MMHG | RESPIRATION RATE: 18 BRPM | HEART RATE: 66 BPM | WEIGHT: 148.38 LBS
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
## 2025-04-22 DIAGNOSIS — T14.8XXA BRUISE OF MUSCLE: ICD-10-CM

## 2025-04-22 DIAGNOSIS — M25.50 ARTHRALGIA OF MULTIPLE JOINTS: ICD-10-CM

## 2025-04-22 DIAGNOSIS — F41.1 GAD (GENERALIZED ANXIETY DISORDER): ICD-10-CM
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
## 2025-04-22 DIAGNOSIS — M54.2 CERVICALGIA: ICD-10-CM
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
## 2025-04-22 DIAGNOSIS — M85.89 OSTEOPENIA OF MULTIPLE SITES: ICD-10-CM

## 2025-04-22 PROCEDURE — 73560 X-RAY EXAM OF KNEE 1 OR 2: CPT | Mod: TC,RT

## 2025-04-22 PROCEDURE — 72110 X-RAY EXAM L-2 SPINE 4/>VWS: CPT | Mod: TC

## 2025-04-22 PROCEDURE — 73502 X-RAY EXAM HIP UNI 2-3 VIEWS: CPT | Mod: TC,LT

## 2025-04-22 PROCEDURE — 73502 X-RAY EXAM HIP UNI 2-3 VIEWS: CPT | Mod: 26,LT,, | Performed by: RADIOLOGY

## 2025-04-22 PROCEDURE — 72050 X-RAY EXAM NECK SPINE 4/5VWS: CPT | Mod: TC

## 2025-04-22 PROCEDURE — 73562 X-RAY EXAM OF KNEE 3: CPT | Mod: 26,LT,, | Performed by: RADIOLOGY

## 2025-04-22 PROCEDURE — 99999 PR PBB SHADOW E&M-EST. PATIENT-LVL V: CPT | Mod: PBBFAC,,, | Performed by: FAMILY MEDICINE

## 2025-04-22 RX ORDER — METHOCARBAMOL 750 MG/1
750 TABLET, FILM COATED ORAL NIGHTLY PRN
Qty: 15 TABLET | Refills: 0 | Status: SHIPPED | OUTPATIENT
Start: 2025-04-22 | End: 2025-05-07

## 2025-04-22 NOTE — PROGRESS NOTES
"Subjective:       Patient ID: Latasha Montes is a 65 y.o. female presents with Problem List[1]     Chief Complaint: Hip Pain, Fall, Bleeding/Bruising (Left Hip), and Knee Pain    65 year old female patient with Patient Active Problem List:     EARL (generalized anxiety disorder)     Tinnitus     Palmar fascial fibromatosis (dupuytren)     Chronic laryngitis     Osteopenia     Mixed hyperlipidemia     Obstructive lung disease  Here reports that patient had a fall at home last Friday, reports that she hit the edge of the bed to the metal frame at the bench at the foot end, and has a bruise to the left hip and the lower back, and left knee buckling up  Patient started having neck discomfort with movement, but denies of any tingling or numbness sensation  Reports pain in multiple joints up to 7/10 but has not been taking any medication for relief including over-the-counter  Patient denies of any loss of consciousness and was able to get up and move around      Review of Systems   Constitutional:  Negative for fatigue.   Eyes:  Negative for visual disturbance.   Respiratory:  Negative for shortness of breath.    Cardiovascular:  Negative for chest pain and leg swelling.   Gastrointestinal:  Negative for abdominal pain, nausea and vomiting.   Musculoskeletal:  Positive for arthralgias, back pain, myalgias and neck pain.   Skin:  Positive for color change. Negative for rash.   Neurological:  Negative for weakness, light-headedness, numbness and headaches.   Psychiatric/Behavioral:  Negative for sleep disturbance.          /74 (BP Location: Right arm, Patient Position: Sitting)   Pulse 66   Resp 18   Ht 5' 9" (1.753 m)   Wt 67.3 kg (148 lb 5.9 oz)   SpO2 99%   BMI 21.91 kg/m²   Objective:      Physical Exam  Constitutional:       Appearance: She is well-developed.   HENT:      Head: Normocephalic and atraumatic.   Cardiovascular:      Rate and Rhythm: Normal rate and regular rhythm.      Heart sounds: Normal " heart sounds. No murmur heard.  Pulmonary:      Effort: Pulmonary effort is normal.      Breath sounds: Normal breath sounds. No wheezing.   Abdominal:      General: Bowel sounds are normal.      Palpations: Abdomen is soft.      Tenderness: There is no abdominal tenderness.   Musculoskeletal:         General: Tenderness present.      Comments: Positive for tenderness to the paraspinal cervical muscles on the left side  Positive for tenderness to the left hip and paraspinal lumbar muscles on the left side  Positive for tenderness to the left knee anteriorly and right ankle dorsally     Skin:     General: Skin is warm and dry.      Findings: Bruising present. No rash.      Comments: Positive for significant bruising to the left hip/paraspinal lumbar muscles on the left side   Neurological:      Mental Status: She is alert and oriented to person, place, and time.   Psychiatric:         Mood and Affect: Mood normal.           Assessment/Plan:   1. Fall at home, initial encounter  -     X-Ray Cervical Spine Complete 5 view; Future; Expected date: 04/22/2025  -     X-Ray Hip 2 or 3 views Left with Pelvis when performed; Future; Expected date: 04/22/2025  -     X-Ray Lumbar Spine 5 View; Future; Expected date: 04/22/2025  -     X-Ray Knee 3 View Left; Future; Expected date: 04/22/2025  -     methocarbamoL (ROBAXIN) 750 MG Tab; Take 1 tablet (750 mg total) by mouth nightly as needed (muscle spasm).  Dispense: 15 tablet; Refill: 0  2. Bruise of muscle  -     X-Ray Hip 2 or 3 views Left with Pelvis when performed; Future; Expected date: 04/22/2025  -     X-Ray Lumbar Spine 5 View; Future; Expected date: 04/22/2025  3. Cervicalgia  -     X-Ray Cervical Spine Complete 5 view; Future; Expected date: 04/22/2025  4. Arthralgia of multiple joints  -     X-Ray Knee 3 View Left; Future; Expected date: 04/22/2025  -     methocarbamoL (ROBAXIN) 750 MG Tab; Take 1 tablet (750 mg total) by mouth nightly as needed (muscle spasm).   Dispense: 15 tablet; Refill: 0    Secondary to fall and multiple joint pains and bruises will get multiple x-rays including lumbar spine cervical spine and left hip and left knee  Patient was advised to take Tylenol as needed for pain and Robaxin prescribed today for symptomatic relief, advised to be cautious with sedation potential  Warm compresses recommended    5. Osteopenia of multiple sites  Continue calcium with vitamin-D supplements    6. EARL (generalized anxiety disorder)  Stable on Zoloft 50 mg daily                  [1]   Patient Active Problem List  Diagnosis    EARL (generalized anxiety disorder)    Tinnitus    Palmar fascial fibromatosis (dupuytren)    Chronic laryngitis    Osteopenia    Mixed hyperlipidemia    Obstructive lung disease

## 2025-04-23 ENCOUNTER — RESULTS FOLLOW-UP (OUTPATIENT)
Dept: INTERNAL MEDICINE | Facility: CLINIC | Age: 65
End: 2025-04-23

## 2025-04-23 ENCOUNTER — TELEPHONE (OUTPATIENT)
Dept: INTERNAL MEDICINE | Facility: CLINIC | Age: 65
End: 2025-04-23
Payer: MEDICARE

## 2025-04-23 NOTE — TELEPHONE ENCOUNTER
----- Message from Tony sent at 4/23/2025  4:27 PM CDT -----  Type:  Needs Medical AdviceWho Called: SAMANTHA LEAL [[id number] (please be specific):  How long has patient had these symptoms:  Pharmacy name and phone #:  Would the patient rather a call back or a response via [facility]? Best Call Back Number:  [phone] Information: Patient will like to get test results

## 2025-05-16 ENCOUNTER — OFFICE VISIT (OUTPATIENT)
Dept: RHEUMATOLOGY | Facility: CLINIC | Age: 65
End: 2025-05-16
Payer: MEDICARE

## 2025-05-16 VITALS
HEART RATE: 73 BPM | BODY MASS INDEX: 22.08 KG/M2 | DIASTOLIC BLOOD PRESSURE: 61 MMHG | SYSTOLIC BLOOD PRESSURE: 106 MMHG | WEIGHT: 149.06 LBS | HEIGHT: 69 IN

## 2025-05-16 DIAGNOSIS — Z78.0 POSTMENOPAUSAL: ICD-10-CM

## 2025-05-16 DIAGNOSIS — M85.80 OSTEOPENIA, UNSPECIFIED LOCATION: ICD-10-CM

## 2025-05-16 PROCEDURE — 99999 PR PBB SHADOW E&M-EST. PATIENT-LVL IV: CPT | Mod: PBBFAC,,, | Performed by: STUDENT IN AN ORGANIZED HEALTH CARE EDUCATION/TRAINING PROGRAM

## 2025-05-16 NOTE — PROGRESS NOTES
"Subjective:      Patient ID: Latasha Montes is a 65 y.o. female.    Chief Complaint: osteopenia    HPI:   Patient presents for Rheumatology evaluation for low bone density. DXA in 7/2024 shows osteopenia with low FRAX.  Previous bone strengthening agents: none  Taking Vitamin D supplements: 5000 IU daily.  History of falls: occasionally falls (trips).  Personal hx of fractures: no known fractures in adulthood.  Family hx of fractures: no  Acid reflux: intermittently  History of skeletal metastases or radiation to the skeleton: no  History of kidney stones: no    Social Hx: Former smoker. Quit decades ago. No heavy alcohol use. 1 cup of coffee daily.    Rheumatology ROS is otherwise negative.      Objective:   Ht 5' 9" (1.753 m)   Wt 67.6 kg (149 lb 0.5 oz)   BMI 22.01 kg/m²   Physical Exam  Constitutional:       General: She is not in acute distress.     Appearance: Normal appearance.   HENT:      Head: Normocephalic and atraumatic.      Mouth/Throat:      Mouth: Mucous membranes are moist.      Pharynx: Oropharynx is clear.   Cardiovascular:      Rate and Rhythm: Normal rate and regular rhythm.   Pulmonary:      Effort: Pulmonary effort is normal.      Breath sounds: Normal breath sounds.   Abdominal:      Palpations: Abdomen is soft.      Tenderness: There is no abdominal tenderness.   Musculoskeletal:         General: No swelling or tenderness.      Cervical back: Normal range of motion. No tenderness.   Skin:     General: Skin is warm and dry.   Neurological:      Mental Status: She is alert and oriented to person, place, and time. Mental status is at baseline.             Assessment and Plan:     Problem List Items Addressed This Visit          Unprioritized    Osteopenia     Other Visit Diagnoses         Postmenopausal               Latest Reference Range & Units 04/08/25 09:43   WBC 3.90 - 12.70 K/uL 5.02   RBC 4.00 - 5.40 M/uL 4.76   Hemoglobin 12.0 - 16.0 gm/dL 14.3   Hematocrit 37.0 - 48.5 % 43.6 "   MCV 82 - 98 fL 92   MCH 27.0 - 31.0 pg 30.0   MCHC 32.0 - 36.0 g/dL 32.8   RDW 11.5 - 14.5 % 11.9   Platelet Count 150 - 450 K/uL 266   MPV 9.2 - 12.9 fL 10.5   Neut % 38 - 73 % 50.7   Lymph % 18 - 48 % 36.9   Mono % 4 - 15 % 9.6   Eos % <=8 % 2.0   Basophil % <=1.9 % 0.8   Immature Granulocytes 0.0 - 0.5 % 0.0   Gran # (ANC) 1.8 - 7.7 K/uL 2.55   Lymph # 1 - 4.8 K/uL 1.85   Mono # 0.3 - 1 K/uL 0.48   Eos # <=0.5 K/uL 0.10   Baso # <=0.2 K/uL 0.04   Immature Grans (Abs) 0.00 - 0.04 K/uL 0.00   nRBC <=0 /100 WBC 0   Sodium 136 - 145 mmol/L 138   Potassium 3.5 - 5.1 mmol/L 4.0   Chloride 95 - 110 mmol/L 102   CO2 23 - 29 mmol/L 26   Anion Gap 8 - 16 mmol/L 10   BUN 8 - 23 mg/dL 14   Creatinine 0.5 - 1.4 mg/dL 0.7   eGFR >60 mL/min/1.73/m2 >60   Glucose 70 - 110 mg/dL 82   Calcium 8.7 - 10.5 mg/dL 9.2   ALP 40 - 150 unit/L 60   PROTEIN TOTAL 6.0 - 8.4 gm/dL 7.4   Albumin 3.5 - 5.2 g/dL 4.0   BILIRUBIN TOTAL 0.1 - 1.0 mg/dL 0.6   AST 11 - 45 unit/L 25   ALT 10 - 44 unit/L 18   Cholesterol Total 120 - 199 mg/dL 226 (H)   HDL 40 - 75 mg/dL 80 (H)   HDL/Cholesterol Ratio 20.0 - 50.0 % 35.4   Non-HDL Cholesterol mg/dL 146   Total Cholesterol/HDL Ratio 2.0 - 5.0  2.8   Triglycerides 30 - 150 mg/dL 92   LDL Cholesterol 63.0 - 159.0 mg/dL 127.6   Vitamin D 30 - 96 ng/mL 66   Hemoglobin A1C External 4.0 - 5.6 % 5.3   Estimated Avg Glucose 68 - 131 mg/dL 105   TSH 0.400 - 4.000 uIU/mL 1.102   Color, UA Straw, Melony, Yellow, Light-Orange  Yellow   Appearance, UA Clear  Clear   Spec Grav UA 1.005 - 1.030  1.015   pH, UA 5.0 - 8.0  8.0   Protein, UA Negative  Negative   Glucose, UA Negative  Negative   Ketones, UA Negative  Negative   Blood, UA Negative  Negative   NITRITE UA Negative  Negative   Bilirubin (UA) Negative  Negative   Leukocyte Esterase, UA Negative  Negative   (H): Data is abnormally high    DXA Bone Density Axial Skeleton 1 or more sites  Order: [id number]   Status: Final result       Next appt: 07/18/2025 at  09:15 AM in Pulmonology (Parth Perez MD)       Dx: Postmenopausal; Osteopenia, unspecifi...    Test Result Released: No (inaccessible in MyChart)    1 Result Note       View Follow-Up Encounter       1  Topic  Details    Reading Physician Reading Date Result Priority   Jamshid Landry MD  [phone]  7/18/2024 Routine     Narrative & Impression  EXAMINATION:  DXA BONE DENSITY AXIAL SKELETON 1 OR MORE SITES     CLINICAL HISTORY:  Asymptomatic menopausal state     TECHNIQUE:  DXA scanning was performed over the left hip and lumbar spine.  Review of the images confirms satisfactory positioning and technique.     COMPARISON:  04/25/2022     FINDINGS:  The L1 to L4 vertebral bone mineral density is equal to 0.92 g/cm squared with a T score of -2.2.  There has been no significant change relative to the prior study.     The left femoral neck bone mineral density is equal to 0.87 g/cm squared with a T score of -1.2.  There has been  no significant change relative to the prior study.     There is a 13% risk of a major osteoporotic fracture and a 1% risk of hip fracture in the next 10 years (FRAX).     Impression:     Osteopenia     Consider FDA approved medical therapies in postmenopausal women and men aged 50 years and older, based on the following:     *A hip or vertebral (clinical or morphometric) fracture  *T score less than or equal to -2.5 at the femoral neck or spine after appropriate evaluation to exclude secondary causes.  *Low bone mass -- also known as osteopenia (T score between -1.0 and -2.5 at the femoral neck or spine) and a 10 year probability of hip fracture greater than or equal to 3% or a 10 year probability of major osteoporosis-related fracture greater than or equal to 20% based on the [place]-adapted WHO algorithm.  *Clinicians judgment and/or patient preference may indicate treatment for people with 10 year fracture probabilities is above or below these levels.        Electronically signed  by:Jamshid Landry MD  Date:                                            07/18/2024  Time:                                           11:26              All of the data above and below has been reviewed by myself and any further interpretations will be reflected in the assessment and plan.   The data includes review of external notes, and independent interpretation of lab results, x-rays, and imaging reports.      Patient presents for Rheumatology evaluation for low bone density.    DXA 7/18/2024:  The L1 to L4 vertebral bone mineral density is equal to 0.92 g/cm squared with a T score of -2.2.  There has been no significant change relative to the prior study.  The left femoral neck bone mineral density is equal to 0.87 g/cm squared with a T score of -1.2.  There has been  no significant change relative to the prior study.      The FRAX on the DXA report is below threshold for treatment with a bone strengthening agent. However even this FRAX is overstated because patient reported a finger fracture in the questionnaire based on bruising she had on her right 5th finger after a fall many years ago. She never sought medical attention or had XR to confirm fracture. The correct FRAX calculated by me is as follows:  There is an 8.1% risk of a major osteoporotic fracture and a 0.8% risk of hip fracture in the next 10 years (FRAX).    Osteopenia with no known hx of fractures and low fracture risk by FRAX.    Plan:  Take vitamin D 1000 units daily.  Get calcium 1200 mg from your diet daily.  Osteoporosis precautions:  Regular exercise: aerobic, strength, flexibility, and balance.  Limitation of alcohol consumption to </= 2 drinks per day.  Limitation of caffeine consumption to </= 2 servings per day.  Fall prevention, avoid high impact/twisting motion, do not flop into a chair.  Continued smoking cessation.  DXA due on or after 7/19/2026.  Follow up with PCP.          I spent a total of 45 minutes on the day of the  visit.  This includes face to face time and non-face to face time preparing to see the patient (eg, review of tests), obtaining and/or reviewing separately obtained history, documenting clinical information in the electronic or other health record, independently interpreting results and communicating results to the patient/family/caregiver, or care coordinator.

## 2025-05-16 NOTE — PATIENT INSTRUCTIONS
Okay to continue vitamin D 5000 units daily.  Get calcium 1200 mg daily, dietary sources preferred.  Osteoporosis precautions:  Regular exercise: aerobic, strength, flexibility, and balance.  Limitation of alcohol consumption to </= 2 drinks per day.  Limitation of caffeine consumption to </= 2 servings per day.  Fall prevention, avoid high impact/twisting motion, do not flop into a chair.

## 2025-07-21 ENCOUNTER — HOSPITAL ENCOUNTER (OUTPATIENT)
Dept: RADIOLOGY | Facility: HOSPITAL | Age: 65
Discharge: HOME | End: 2025-07-21
Attending: FAMILY MEDICINE
Payer: MEDICARE

## 2025-07-21 VITALS — WEIGHT: 149.06 LBS | BODY MASS INDEX: 22.08 KG/M2 | HEIGHT: 69 IN

## 2025-07-21 DIAGNOSIS — Z12.31 BREAST CANCER SCREENING BY MAMMOGRAM: ICD-10-CM

## 2025-07-21 PROCEDURE — 77063 BREAST TOMOSYNTHESIS BI: CPT | Mod: 26,,, | Performed by: STUDENT IN AN ORGANIZED HEALTH CARE EDUCATION/TRAINING PROGRAM

## 2025-07-21 PROCEDURE — 77067 SCR MAMMO BI INCL CAD: CPT | Mod: 26,,, | Performed by: STUDENT IN AN ORGANIZED HEALTH CARE EDUCATION/TRAINING PROGRAM

## 2025-07-21 PROCEDURE — 77063 BREAST TOMOSYNTHESIS BI: CPT | Mod: TC
